# Patient Record
Sex: MALE | Race: WHITE | Employment: OTHER | URBAN - METROPOLITAN AREA
[De-identification: names, ages, dates, MRNs, and addresses within clinical notes are randomized per-mention and may not be internally consistent; named-entity substitution may affect disease eponyms.]

---

## 2017-01-10 ENCOUNTER — HOSPITAL ENCOUNTER (OUTPATIENT)
Dept: ULTRASOUND IMAGING | Age: 77
Discharge: HOME | End: 2017-01-10
Attending: INTERNAL MEDICINE | Admitting: INTERNAL MEDICINE

## 2017-01-10 DIAGNOSIS — N28.89 OTHER SPECIFIED DISORDERS OF KIDNEY AND URETER: ICD-10-CM

## 2017-01-10 DIAGNOSIS — N18.30 CHRONIC KIDNEY DISEASE (CKD), STAGE III (MODERATE) (HCC): ICD-10-CM

## 2018-05-14 ENCOUNTER — TELEPHONE (OUTPATIENT)
Dept: SURGERY | Age: 78
End: 2018-05-14

## 2019-02-28 ENCOUNTER — APPOINTMENT (OUTPATIENT)
Dept: GENERAL RADIOLOGY | Age: 79
DRG: 291 | End: 2019-02-28
Payer: MEDICARE

## 2019-02-28 ENCOUNTER — HOSPITAL ENCOUNTER (INPATIENT)
Age: 79
LOS: 3 days | Discharge: LEFT BEFORE DISCHARGE | DRG: 291 | End: 2019-03-03
Attending: EMERGENCY MEDICINE | Admitting: INTERNAL MEDICINE
Payer: MEDICARE

## 2019-02-28 DIAGNOSIS — J96.01 ACUTE RESPIRATORY FAILURE WITH HYPOXIA (HCC): ICD-10-CM

## 2019-02-28 DIAGNOSIS — R06.09 DYSPNEA ON EXERTION: ICD-10-CM

## 2019-02-28 DIAGNOSIS — N17.9 AKI (ACUTE KIDNEY INJURY) (HCC): ICD-10-CM

## 2019-02-28 DIAGNOSIS — J18.9 COMMUNITY ACQUIRED PNEUMONIA OF RIGHT LOWER LOBE OF LUNG: Primary | ICD-10-CM

## 2019-02-28 DIAGNOSIS — I50.9 ACUTE CONGESTIVE HEART FAILURE, UNSPECIFIED HEART FAILURE TYPE (HCC): ICD-10-CM

## 2019-02-28 PROBLEM — D69.6 THROMBOCYTOPENIA (HCC): Chronic | Status: ACTIVE | Noted: 2019-02-28

## 2019-02-28 PROBLEM — I35.0 NONRHEUMATIC AORTIC VALVE STENOSIS: Chronic | Status: ACTIVE | Noted: 2017-03-20

## 2019-02-28 PROBLEM — I48.91 ATRIAL FIBRILLATION (HCC): Status: ACTIVE | Noted: 2019-02-28

## 2019-02-28 PROBLEM — I73.9 PERIPHERAL VASCULAR DISEASE (HCC): Status: ACTIVE | Noted: 2019-02-28

## 2019-02-28 PROBLEM — F31.9 BIPOLAR AFFECTIVE DISORDER (HCC): Status: ACTIVE | Noted: 2019-02-28

## 2019-02-28 PROBLEM — N18.4 CKD (CHRONIC KIDNEY DISEASE) STAGE 4, GFR 15-29 ML/MIN (HCC): Status: ACTIVE | Noted: 2017-03-19

## 2019-02-28 PROBLEM — C44.92 SQUAMOUS CELL CARCINOMA OF SKIN: Chronic | Status: ACTIVE | Noted: 2019-02-28

## 2019-02-28 PROBLEM — I48.91 ATRIAL FIBRILLATION WITH RAPID VENTRICULAR RESPONSE (HCC): Status: ACTIVE | Noted: 2017-03-11

## 2019-02-28 PROBLEM — N18.4 ANEMIA OF CHRONIC RENAL FAILURE, STAGE 4 (SEVERE) (HCC): Chronic | Status: ACTIVE | Noted: 2017-03-20

## 2019-02-28 PROBLEM — I71.40 AAA (ABDOMINAL AORTIC ANEURYSM) WITHOUT RUPTURE: Status: ACTIVE | Noted: 2017-11-29

## 2019-02-28 PROBLEM — C44.92 SQUAMOUS CELL CARCINOMA OF SKIN: Status: ACTIVE | Noted: 2019-02-28

## 2019-02-28 PROBLEM — I50.32 CHRONIC DIASTOLIC CONGESTIVE HEART FAILURE (HCC): Chronic | Status: ACTIVE | Noted: 2017-03-29

## 2019-02-28 PROBLEM — I50.32 CHRONIC DIASTOLIC CONGESTIVE HEART FAILURE (HCC): Status: ACTIVE | Noted: 2017-03-29

## 2019-02-28 PROBLEM — R60.0 BILATERAL LEG EDEMA: Status: ACTIVE | Noted: 2017-11-29

## 2019-02-28 PROBLEM — N18.4 ANEMIA OF CHRONIC RENAL FAILURE, STAGE 4 (SEVERE) (HCC): Status: ACTIVE | Noted: 2017-03-20

## 2019-02-28 PROBLEM — R33.9 URINARY RETENTION WITH INCOMPLETE BLADDER EMPTYING: Status: ACTIVE | Noted: 2017-03-19

## 2019-02-28 PROBLEM — J98.6 ACQUIRED ELEVATED DIAPHRAGM: Status: ACTIVE | Noted: 2017-03-20

## 2019-02-28 PROBLEM — N25.81 SECONDARY HYPERPARATHYROIDISM, RENAL (HCC): Chronic | Status: ACTIVE | Noted: 2018-07-19

## 2019-02-28 PROBLEM — D63.1 ANEMIA OF CHRONIC RENAL FAILURE, STAGE 4 (SEVERE) (HCC): Chronic | Status: ACTIVE | Noted: 2017-03-20

## 2019-02-28 PROBLEM — E55.9 VITAMIN D DEFICIENCY: Status: ACTIVE | Noted: 2018-07-19

## 2019-02-28 PROBLEM — I44.2 INTERMITTENT COMPLETE HEART BLOCK (HCC): Status: ACTIVE | Noted: 2018-08-25

## 2019-02-28 PROBLEM — I71.40 AAA (ABDOMINAL AORTIC ANEURYSM) WITHOUT RUPTURE: Chronic | Status: ACTIVE | Noted: 2017-11-29

## 2019-02-28 PROBLEM — Z79.01 ANTICOAGULATED: Chronic | Status: ACTIVE | Noted: 2019-02-28

## 2019-02-28 PROBLEM — I34.0 NON-RHEUMATIC MITRAL REGURGITATION: Status: ACTIVE | Noted: 2017-03-20

## 2019-02-28 PROBLEM — I71.019 DISSECTION OF THORACIC AORTA: Chronic | Status: ACTIVE | Noted: 2019-02-28

## 2019-02-28 PROBLEM — I71.019 AORTIC DISSECTION, THORACIC: Status: RESOLVED | Noted: 2017-03-20 | Resolved: 2019-02-28

## 2019-02-28 PROBLEM — D50.9 IRON DEFICIENCY ANEMIA: Status: ACTIVE | Noted: 2017-03-29

## 2019-02-28 PROBLEM — E78.5 HYPERLIPIDEMIA: Chronic | Status: ACTIVE | Noted: 2019-02-28

## 2019-02-28 PROBLEM — Z95.0 CARDIAC PACEMAKER IN SITU: Status: ACTIVE | Noted: 2017-11-10

## 2019-02-28 PROBLEM — D50.9 IRON DEFICIENCY ANEMIA: Chronic | Status: ACTIVE | Noted: 2017-03-29

## 2019-02-28 PROBLEM — E78.5 HYPERLIPIDEMIA: Status: ACTIVE | Noted: 2019-02-28

## 2019-02-28 PROBLEM — I71.019 DISSECTION OF THORACIC AORTA: Status: ACTIVE | Noted: 2019-02-28

## 2019-02-28 PROBLEM — N25.81 SECONDARY HYPERPARATHYROIDISM, RENAL (HCC): Status: ACTIVE | Noted: 2018-07-19

## 2019-02-28 PROBLEM — I35.0 NONRHEUMATIC AORTIC VALVE STENOSIS: Status: ACTIVE | Noted: 2017-03-20

## 2019-02-28 PROBLEM — J98.6 ACQUIRED ELEVATED DIAPHRAGM: Chronic | Status: ACTIVE | Noted: 2017-03-20

## 2019-02-28 PROBLEM — I48.20 CHRONIC ATRIAL FIBRILLATION (HCC): Chronic | Status: ACTIVE | Noted: 2017-01-06

## 2019-02-28 PROBLEM — I48.21 PERMANENT ATRIAL FIBRILLATION (HCC): Status: ACTIVE | Noted: 2017-06-29

## 2019-02-28 PROBLEM — I71.019 AORTIC DISSECTION, THORACIC: Status: ACTIVE | Noted: 2017-03-20

## 2019-02-28 PROBLEM — N18.4 CKD (CHRONIC KIDNEY DISEASE) STAGE 4, GFR 15-29 ML/MIN (HCC): Chronic | Status: ACTIVE | Noted: 2017-03-19

## 2019-02-28 PROBLEM — Z95.0 CARDIAC PACEMAKER IN SITU: Chronic | Status: ACTIVE | Noted: 2017-11-10

## 2019-02-28 PROBLEM — F31.9 BIPOLAR AFFECTIVE DISORDER (HCC): Chronic | Status: ACTIVE | Noted: 2019-02-28

## 2019-02-28 PROBLEM — I48.20 CHRONIC ATRIAL FIBRILLATION (HCC): Status: ACTIVE | Noted: 2017-01-06

## 2019-02-28 PROBLEM — I34.0 NON-RHEUMATIC MITRAL REGURGITATION: Chronic | Status: ACTIVE | Noted: 2017-03-20

## 2019-02-28 PROBLEM — D63.1 ANEMIA OF CHRONIC RENAL FAILURE, STAGE 4 (SEVERE) (HCC): Status: ACTIVE | Noted: 2017-03-20

## 2019-02-28 LAB
ALBUMIN SERPL-MCNC: 4 G/DL (ref 3.4–5)
ALP BLD-CCNC: 198 U/L (ref 40–129)
ALT SERPL-CCNC: 13 U/L (ref 10–40)
ANION GAP SERPL CALCULATED.3IONS-SCNC: 17 MMOL/L (ref 3–16)
AST SERPL-CCNC: 17 U/L (ref 15–37)
BASE EXCESS VENOUS: -4 (ref -3–3)
BASE EXCESS VENOUS: 0 (ref -3–3)
BASOPHILS ABSOLUTE: 0.1 K/UL (ref 0–0.2)
BASOPHILS RELATIVE PERCENT: 1.4 %
BILIRUB SERPL-MCNC: 0.7 MG/DL (ref 0–1)
BILIRUBIN DIRECT: <0.2 MG/DL (ref 0–0.3)
BILIRUBIN URINE: NEGATIVE
BILIRUBIN, INDIRECT: ABNORMAL MG/DL (ref 0–1)
BLOOD, URINE: NEGATIVE
BUN BLDV-MCNC: 38 MG/DL (ref 7–20)
CALCIUM SERPL-MCNC: 8.6 MG/DL (ref 8.3–10.6)
CHLORIDE BLD-SCNC: 101 MMOL/L (ref 99–110)
CLARITY: CLEAR
CO2: 21 MMOL/L (ref 21–32)
COLOR: YELLOW
CREAT SERPL-MCNC: 2.4 MG/DL (ref 0.8–1.3)
EOSINOPHILS ABSOLUTE: 0.2 K/UL (ref 0–0.6)
EOSINOPHILS RELATIVE PERCENT: 2.3 %
GFR AFRICAN AMERICAN: 32
GFR NON-AFRICAN AMERICAN: 26
GLUCOSE BLD-MCNC: 103 MG/DL (ref 70–99)
GLUCOSE URINE: NEGATIVE MG/DL
HCO3 VENOUS: 22.9 MMOL/L (ref 23–29)
HCO3 VENOUS: 26.2 MMOL/L (ref 23–29)
HCT VFR BLD CALC: 37.4 % (ref 40.5–52.5)
HEMOGLOBIN: 11.7 G/DL (ref 13.5–17.5)
INR BLD: 1.55 (ref 0.86–1.14)
KETONES, URINE: NEGATIVE MG/DL
LACTATE: 1.61 MMOL/L (ref 0.4–2)
LEUKOCYTE ESTERASE, URINE: NEGATIVE
LYMPHOCYTES ABSOLUTE: 0.6 K/UL (ref 1–5.1)
LYMPHOCYTES RELATIVE PERCENT: 9 %
MCH RBC QN AUTO: 30.3 PG (ref 26–34)
MCHC RBC AUTO-ENTMCNC: 31.4 G/DL (ref 31–36)
MCV RBC AUTO: 96.4 FL (ref 80–100)
MICROSCOPIC EXAMINATION: NORMAL
MONOCYTES ABSOLUTE: 0.5 K/UL (ref 0–1.3)
MONOCYTES RELATIVE PERCENT: 7.7 %
NEUTROPHILS ABSOLUTE: 5.5 K/UL (ref 1.7–7.7)
NEUTROPHILS RELATIVE PERCENT: 79.6 %
NITRITE, URINE: NEGATIVE
O2 SAT, VEN: 54 %
O2 SAT, VEN: 55 %
PCO2, VEN: 47.9 MM HG (ref 40–50)
PCO2, VEN: 53.2 MM HG (ref 40–50)
PDW BLD-RTO: 18.1 % (ref 12.4–15.4)
PERFORMED ON: ABNORMAL
PERFORMED ON: ABNORMAL
PH UA: 6
PH VENOUS: 7.29 (ref 7.35–7.45)
PH VENOUS: 7.3 (ref 7.35–7.45)
PLATELET # BLD: 109 K/UL (ref 135–450)
PMV BLD AUTO: 10.1 FL (ref 5–10.5)
PO2, VEN: 32 MM HG
PO2, VEN: 32 MM HG
POC SAMPLE TYPE: ABNORMAL
POC SAMPLE TYPE: ABNORMAL
POTASSIUM REFLEX MAGNESIUM: 4.7 MMOL/L (ref 3.5–5.1)
PRO-BNP: ABNORMAL PG/ML (ref 0–449)
PROTEIN UA: NEGATIVE MG/DL
PROTHROMBIN TIME: 17.7 SEC (ref 9.8–13)
RBC # BLD: 3.88 M/UL (ref 4.2–5.9)
SODIUM BLD-SCNC: 139 MMOL/L (ref 136–145)
SPECIFIC GRAVITY UA: 1.01
TCO2 CALC VENOUS: 24 MMOL/L
TCO2 CALC VENOUS: 28 MMOL/L
TOTAL PROTEIN: 7.1 G/DL (ref 6.4–8.2)
TROPONIN: 0.02 NG/ML
TROPONIN: 0.02 NG/ML
URINE TYPE: NORMAL
UROBILINOGEN, URINE: 0.2 E.U./DL
WBC # BLD: 6.9 K/UL (ref 4–11)

## 2019-02-28 PROCEDURE — 83880 ASSAY OF NATRIURETIC PEPTIDE: CPT

## 2019-02-28 PROCEDURE — 6360000002 HC RX W HCPCS: Performed by: PHYSICIAN ASSISTANT

## 2019-02-28 PROCEDURE — 84443 ASSAY THYROID STIM HORMONE: CPT

## 2019-02-28 PROCEDURE — 2580000003 HC RX 258: Performed by: STUDENT IN AN ORGANIZED HEALTH CARE EDUCATION/TRAINING PROGRAM

## 2019-02-28 PROCEDURE — 80048 BASIC METABOLIC PNL TOTAL CA: CPT

## 2019-02-28 PROCEDURE — 96367 TX/PROPH/DG ADDL SEQ IV INF: CPT

## 2019-02-28 PROCEDURE — 71046 X-RAY EXAM CHEST 2 VIEWS: CPT

## 2019-02-28 PROCEDURE — 2580000003 HC RX 258: Performed by: PHYSICIAN ASSISTANT

## 2019-02-28 PROCEDURE — 87040 BLOOD CULTURE FOR BACTERIA: CPT

## 2019-02-28 PROCEDURE — 85025 COMPLETE CBC W/AUTO DIFF WBC: CPT

## 2019-02-28 PROCEDURE — 36415 COLL VENOUS BLD VENIPUNCTURE: CPT

## 2019-02-28 PROCEDURE — 84484 ASSAY OF TROPONIN QUANT: CPT

## 2019-02-28 PROCEDURE — 81003 URINALYSIS AUTO W/O SCOPE: CPT

## 2019-02-28 PROCEDURE — 1200000000 HC SEMI PRIVATE

## 2019-02-28 PROCEDURE — 83605 ASSAY OF LACTIC ACID: CPT

## 2019-02-28 PROCEDURE — 6360000002 HC RX W HCPCS: Performed by: STUDENT IN AN ORGANIZED HEALTH CARE EDUCATION/TRAINING PROGRAM

## 2019-02-28 PROCEDURE — 82803 BLOOD GASES ANY COMBINATION: CPT

## 2019-02-28 PROCEDURE — 80076 HEPATIC FUNCTION PANEL: CPT

## 2019-02-28 PROCEDURE — 85610 PROTHROMBIN TIME: CPT

## 2019-02-28 PROCEDURE — 96365 THER/PROPH/DIAG IV INF INIT: CPT

## 2019-02-28 PROCEDURE — 99285 EMERGENCY DEPT VISIT HI MDM: CPT

## 2019-02-28 PROCEDURE — 6370000000 HC RX 637 (ALT 250 FOR IP): Performed by: STUDENT IN AN ORGANIZED HEALTH CARE EDUCATION/TRAINING PROGRAM

## 2019-02-28 PROCEDURE — 96375 TX/PRO/DX INJ NEW DRUG ADDON: CPT

## 2019-02-28 PROCEDURE — 93005 ELECTROCARDIOGRAM TRACING: CPT | Performed by: PHYSICIAN ASSISTANT

## 2019-02-28 PROCEDURE — 84439 ASSAY OF FREE THYROXINE: CPT

## 2019-02-28 RX ORDER — METOPROLOL SUCCINATE 25 MG/1
25 TABLET, EXTENDED RELEASE ORAL DAILY
Status: DISCONTINUED | OUTPATIENT
Start: 2019-03-01 | End: 2019-02-28

## 2019-02-28 RX ORDER — TRANYLCYPROMINE 10 MG/1
10 TABLET ORAL
Status: DISCONTINUED | OUTPATIENT
Start: 2019-03-01 | End: 2019-03-03 | Stop reason: HOSPADM

## 2019-02-28 RX ORDER — AMLODIPINE BESYLATE 5 MG/1
5 TABLET ORAL DAILY
Status: DISCONTINUED | OUTPATIENT
Start: 2019-03-01 | End: 2019-03-03 | Stop reason: HOSPADM

## 2019-02-28 RX ORDER — TRANYLCYPROMINE 10 MG/1
10 TABLET ORAL 2 TIMES DAILY
Status: DISCONTINUED | OUTPATIENT
Start: 2019-02-28 | End: 2019-02-28

## 2019-02-28 RX ORDER — ACETAMINOPHEN 500 MG
500-1000 TABLET ORAL EVERY 6 HOURS PRN
COMMUNITY

## 2019-02-28 RX ORDER — PROPAFENONE HYDROCHLORIDE 150 MG/1
225 TABLET, FILM COATED ORAL EVERY 12 HOURS
Status: DISCONTINUED | OUTPATIENT
Start: 2019-02-28 | End: 2019-02-28

## 2019-02-28 RX ORDER — SODIUM CHLORIDE 0.9 % (FLUSH) 0.9 %
10 SYRINGE (ML) INJECTION EVERY 12 HOURS SCHEDULED
Status: DISCONTINUED | OUTPATIENT
Start: 2019-02-28 | End: 2019-03-03 | Stop reason: HOSPADM

## 2019-02-28 RX ORDER — SODIUM CHLORIDE 0.9 % (FLUSH) 0.9 %
10 SYRINGE (ML) INJECTION PRN
Status: DISCONTINUED | OUTPATIENT
Start: 2019-02-28 | End: 2019-03-03 | Stop reason: HOSPADM

## 2019-02-28 RX ORDER — ATORVASTATIN CALCIUM 10 MG/1
10 TABLET, FILM COATED ORAL NIGHTLY
Status: DISCONTINUED | OUTPATIENT
Start: 2019-02-28 | End: 2019-03-03 | Stop reason: HOSPADM

## 2019-02-28 RX ORDER — QUETIAPINE FUMARATE 200 MG/1
300 TABLET, FILM COATED ORAL NIGHTLY
Status: DISCONTINUED | OUTPATIENT
Start: 2019-02-28 | End: 2019-03-03 | Stop reason: HOSPADM

## 2019-02-28 RX ORDER — QUETIAPINE FUMARATE 300 MG/1
300 TABLET, FILM COATED ORAL NIGHTLY
COMMUNITY
End: 2020-11-11 | Stop reason: CLARIF

## 2019-02-28 RX ORDER — AMLODIPINE BESYLATE AND BENAZEPRIL HYDROCHLORIDE 10; 20 MG/1; MG/1
1 CAPSULE ORAL DAILY
Status: DISCONTINUED | OUTPATIENT
Start: 2019-02-28 | End: 2019-02-28

## 2019-02-28 RX ORDER — METOPROLOL SUCCINATE 25 MG/1
25 TABLET, EXTENDED RELEASE ORAL 2 TIMES DAILY
Status: DISCONTINUED | OUTPATIENT
Start: 2019-02-28 | End: 2019-03-03 | Stop reason: HOSPADM

## 2019-02-28 RX ORDER — FUROSEMIDE 10 MG/ML
40 INJECTION INTRAMUSCULAR; INTRAVENOUS 3 TIMES DAILY
Status: DISCONTINUED | OUTPATIENT
Start: 2019-02-28 | End: 2019-03-01

## 2019-02-28 RX ORDER — FUROSEMIDE 20 MG/1
20 TABLET ORAL 2 TIMES DAILY
Status: ON HOLD | COMMUNITY
End: 2019-03-03 | Stop reason: SDUPTHER

## 2019-02-28 RX ORDER — FUROSEMIDE 10 MG/ML
40 INJECTION INTRAMUSCULAR; INTRAVENOUS ONCE
Status: COMPLETED | OUTPATIENT
Start: 2019-02-28 | End: 2019-02-28

## 2019-02-28 RX ORDER — FERROUS SULFATE 325(65) MG
325 TABLET ORAL
COMMUNITY
End: 2020-11-11 | Stop reason: CLARIF

## 2019-02-28 RX ORDER — ONDANSETRON 2 MG/ML
4 INJECTION INTRAMUSCULAR; INTRAVENOUS EVERY 6 HOURS PRN
Status: DISCONTINUED | OUTPATIENT
Start: 2019-02-28 | End: 2019-03-03 | Stop reason: HOSPADM

## 2019-02-28 RX ORDER — TRANYLCYPROMINE 10 MG/1
30 TABLET ORAL DAILY
Status: DISCONTINUED | OUTPATIENT
Start: 2019-03-01 | End: 2019-03-03 | Stop reason: HOSPADM

## 2019-02-28 RX ORDER — GUAIFENESIN 600 MG/1
600 TABLET, EXTENDED RELEASE ORAL 2 TIMES DAILY PRN
COMMUNITY
End: 2020-11-11 | Stop reason: CLARIF

## 2019-02-28 RX ORDER — AMLODIPINE BESYLATE 5 MG/1
5 TABLET ORAL DAILY
Status: ON HOLD | COMMUNITY
End: 2019-03-03 | Stop reason: HOSPADM

## 2019-02-28 RX ADMIN — AZITHROMYCIN MONOHYDRATE 500 MG: 500 INJECTION, POWDER, LYOPHILIZED, FOR SOLUTION INTRAVENOUS at 16:04

## 2019-02-28 RX ADMIN — ATORVASTATIN CALCIUM 10 MG: 10 TABLET, FILM COATED ORAL at 21:43

## 2019-02-28 RX ADMIN — QUETIAPINE FUMARATE 300 MG: 200 TABLET ORAL at 21:44

## 2019-02-28 RX ADMIN — METOPROLOL SUCCINATE 25 MG: 25 TABLET, EXTENDED RELEASE ORAL at 21:45

## 2019-02-28 RX ADMIN — Medication 10 ML: at 21:48

## 2019-02-28 RX ADMIN — CEFTRIAXONE 1 G: 1 INJECTION, POWDER, FOR SOLUTION INTRAMUSCULAR; INTRAVENOUS at 15:29

## 2019-02-28 RX ADMIN — FUROSEMIDE 40 MG: 10 INJECTION, SOLUTION INTRAMUSCULAR; INTRAVENOUS at 15:23

## 2019-02-28 RX ADMIN — FUROSEMIDE 40 MG: 10 INJECTION, SOLUTION INTRAMUSCULAR; INTRAVENOUS at 21:46

## 2019-02-28 RX ADMIN — APIXABAN 2.5 MG: 2.5 TABLET, FILM COATED ORAL at 21:43

## 2019-02-28 ASSESSMENT — ENCOUNTER SYMPTOMS
COUGH: 1
SHORTNESS OF BREATH: 1

## 2019-02-28 ASSESSMENT — PAIN SCALES - GENERAL
PAINLEVEL_OUTOF10: 0
PAINLEVEL_OUTOF10: 0

## 2019-03-01 PROBLEM — I48.91 ATRIAL FIBRILLATION WITH RAPID VENTRICULAR RESPONSE (HCC): Status: RESOLVED | Noted: 2017-03-11 | Resolved: 2019-03-01

## 2019-03-01 PROBLEM — I73.9 PERIPHERAL VASCULAR DISEASE (HCC): Chronic | Status: ACTIVE | Noted: 2019-02-28

## 2019-03-01 PROBLEM — I48.91 ATRIAL FIBRILLATION (HCC): Status: RESOLVED | Noted: 2019-02-28 | Resolved: 2019-03-01

## 2019-03-01 LAB
ALBUMIN SERPL-MCNC: 4 G/DL (ref 3.4–5)
ANION GAP SERPL CALCULATED.3IONS-SCNC: 14 MMOL/L (ref 3–16)
ANION GAP SERPL CALCULATED.3IONS-SCNC: 14 MMOL/L (ref 3–16)
BASOPHILS ABSOLUTE: 0 K/UL (ref 0–0.2)
BASOPHILS RELATIVE PERCENT: 0.9 %
BUN BLDV-MCNC: 36 MG/DL (ref 7–20)
BUN BLDV-MCNC: 37 MG/DL (ref 7–20)
CALCIUM SERPL-MCNC: 8.4 MG/DL (ref 8.3–10.6)
CALCIUM SERPL-MCNC: 9 MG/DL (ref 8.3–10.6)
CHLORIDE BLD-SCNC: 101 MMOL/L (ref 99–110)
CHLORIDE BLD-SCNC: 97 MMOL/L (ref 99–110)
CO2: 23 MMOL/L (ref 21–32)
CO2: 25 MMOL/L (ref 21–32)
CREAT SERPL-MCNC: 2.7 MG/DL (ref 0.8–1.3)
CREAT SERPL-MCNC: 2.7 MG/DL (ref 0.8–1.3)
EKG ATRIAL RATE: 81 BPM
EKG DIAGNOSIS: NORMAL
EKG Q-T INTERVAL: 426 MS
EKG QRS DURATION: 96 MS
EKG QTC CALCULATION (BAZETT): 482 MS
EKG R AXIS: 90 DEGREES
EKG T AXIS: 60 DEGREES
EKG VENTRICULAR RATE: 77 BPM
EOSINOPHILS ABSOLUTE: 0.1 K/UL (ref 0–0.6)
EOSINOPHILS RELATIVE PERCENT: 2.8 %
GFR AFRICAN AMERICAN: 28
GFR AFRICAN AMERICAN: 28
GFR NON-AFRICAN AMERICAN: 23
GFR NON-AFRICAN AMERICAN: 23
GLUCOSE BLD-MCNC: 119 MG/DL (ref 70–99)
GLUCOSE BLD-MCNC: 90 MG/DL (ref 70–99)
HCT VFR BLD CALC: 35.8 % (ref 40.5–52.5)
HEMOGLOBIN: 11.4 G/DL (ref 13.5–17.5)
LV EF: 58 %
LVEF MODALITY: NORMAL
LYMPHOCYTES ABSOLUTE: 0.7 K/UL (ref 1–5.1)
LYMPHOCYTES RELATIVE PERCENT: 14.1 %
MAGNESIUM: 2.1 MG/DL (ref 1.8–2.4)
MCH RBC QN AUTO: 30.8 PG (ref 26–34)
MCHC RBC AUTO-ENTMCNC: 31.7 G/DL (ref 31–36)
MCV RBC AUTO: 97.1 FL (ref 80–100)
MONOCYTES ABSOLUTE: 0.6 K/UL (ref 0–1.3)
MONOCYTES RELATIVE PERCENT: 11.6 %
NEUTROPHILS ABSOLUTE: 3.6 K/UL (ref 1.7–7.7)
NEUTROPHILS RELATIVE PERCENT: 70.6 %
PDW BLD-RTO: 17.9 % (ref 12.4–15.4)
PHOSPHORUS: 4.2 MG/DL (ref 2.5–4.9)
PLATELET # BLD: 88 K/UL (ref 135–450)
PMV BLD AUTO: 9.8 FL (ref 5–10.5)
POTASSIUM SERPL-SCNC: 4 MMOL/L (ref 3.5–5.1)
POTASSIUM SERPL-SCNC: 4.9 MMOL/L (ref 3.5–5.1)
RBC # BLD: 3.69 M/UL (ref 4.2–5.9)
SODIUM BLD-SCNC: 136 MMOL/L (ref 136–145)
SODIUM BLD-SCNC: 138 MMOL/L (ref 136–145)
T4 FREE: 0.9 NG/DL (ref 0.9–1.8)
TROPONIN: 0.02 NG/ML
TSH SERPL DL<=0.05 MIU/L-ACNC: 1.86 UIU/ML (ref 0.27–4.2)
WBC # BLD: 5.1 K/UL (ref 4–11)

## 2019-03-01 PROCEDURE — 6360000002 HC RX W HCPCS: Performed by: INTERNAL MEDICINE

## 2019-03-01 PROCEDURE — G0008 ADMIN INFLUENZA VIRUS VAC: HCPCS | Performed by: INTERNAL MEDICINE

## 2019-03-01 PROCEDURE — 2580000003 HC RX 258: Performed by: STUDENT IN AN ORGANIZED HEALTH CARE EDUCATION/TRAINING PROGRAM

## 2019-03-01 PROCEDURE — 93306 TTE W/DOPPLER COMPLETE: CPT

## 2019-03-01 PROCEDURE — 83735 ASSAY OF MAGNESIUM: CPT

## 2019-03-01 PROCEDURE — 36415 COLL VENOUS BLD VENIPUNCTURE: CPT

## 2019-03-01 PROCEDURE — 6370000000 HC RX 637 (ALT 250 FOR IP): Performed by: STUDENT IN AN ORGANIZED HEALTH CARE EDUCATION/TRAINING PROGRAM

## 2019-03-01 PROCEDURE — 80069 RENAL FUNCTION PANEL: CPT

## 2019-03-01 PROCEDURE — 80061 LIPID PANEL: CPT

## 2019-03-01 PROCEDURE — 90686 IIV4 VACC NO PRSV 0.5 ML IM: CPT | Performed by: INTERNAL MEDICINE

## 2019-03-01 PROCEDURE — 85025 COMPLETE CBC W/AUTO DIFF WBC: CPT

## 2019-03-01 PROCEDURE — 6360000002 HC RX W HCPCS: Performed by: STUDENT IN AN ORGANIZED HEALTH CARE EDUCATION/TRAINING PROGRAM

## 2019-03-01 PROCEDURE — 1200000000 HC SEMI PRIVATE

## 2019-03-01 RX ORDER — FUROSEMIDE 10 MG/ML
40 INJECTION INTRAMUSCULAR; INTRAVENOUS 2 TIMES DAILY
Status: DISCONTINUED | OUTPATIENT
Start: 2019-03-01 | End: 2019-03-02

## 2019-03-01 RX ADMIN — Medication 10 ML: at 21:36

## 2019-03-01 RX ADMIN — AMLODIPINE BESYLATE 5 MG: 5 TABLET ORAL at 09:24

## 2019-03-01 RX ADMIN — ATORVASTATIN CALCIUM 10 MG: 10 TABLET, FILM COATED ORAL at 21:35

## 2019-03-01 RX ADMIN — Medication 10 ML: at 09:25

## 2019-03-01 RX ADMIN — FUROSEMIDE 40 MG: 10 INJECTION, SOLUTION INTRAMUSCULAR; INTRAVENOUS at 18:19

## 2019-03-01 RX ADMIN — METOPROLOL SUCCINATE 25 MG: 25 TABLET, EXTENDED RELEASE ORAL at 09:24

## 2019-03-01 RX ADMIN — APIXABAN 2.5 MG: 2.5 TABLET, FILM COATED ORAL at 21:35

## 2019-03-01 RX ADMIN — FUROSEMIDE 40 MG: 10 INJECTION, SOLUTION INTRAMUSCULAR; INTRAVENOUS at 09:24

## 2019-03-01 RX ADMIN — INFLUENZA A VIRUS A/MICHIGAN/45/2015 X-275 (H1N1) ANTIGEN (FORMALDEHYDE INACTIVATED), INFLUENZA A VIRUS A/SINGAPORE/INFIMH-16-0019/2016 IVR-186 (H3N2) ANTIGEN (FORMALDEHYDE INACTIVATED), INFLUENZA B VIRUS B/PHUKET/3073/2013 ANTIGEN (FORMALDEHYDE INACTIVATED), AND INFLUENZA B VIRUS B/MARYLAND/15/2016 BX-69A ANTIGEN (FORMALDEHYDE INACTIVATED) 0.5 ML: 15; 15; 15; 15 INJECTION, SUSPENSION INTRAMUSCULAR at 09:28

## 2019-03-01 RX ADMIN — Medication 10 ML: at 18:20

## 2019-03-01 RX ADMIN — METOPROLOL SUCCINATE 25 MG: 25 TABLET, EXTENDED RELEASE ORAL at 21:34

## 2019-03-01 RX ADMIN — APIXABAN 2.5 MG: 2.5 TABLET, FILM COATED ORAL at 09:24

## 2019-03-01 RX ADMIN — QUETIAPINE FUMARATE 300 MG: 200 TABLET ORAL at 21:34

## 2019-03-01 ASSESSMENT — PAIN SCALES - GENERAL
PAINLEVEL_OUTOF10: 0

## 2019-03-02 ENCOUNTER — APPOINTMENT (OUTPATIENT)
Dept: GENERAL RADIOLOGY | Age: 79
DRG: 291 | End: 2019-03-02
Payer: MEDICARE

## 2019-03-02 LAB
ANION GAP SERPL CALCULATED.3IONS-SCNC: 11 MMOL/L (ref 3–16)
BASOPHILS ABSOLUTE: 0.1 K/UL (ref 0–0.2)
BASOPHILS RELATIVE PERCENT: 1 %
BUN BLDV-MCNC: 39 MG/DL (ref 7–20)
CALCIUM SERPL-MCNC: 8.5 MG/DL (ref 8.3–10.6)
CHLORIDE BLD-SCNC: 101 MMOL/L (ref 99–110)
CHOLESTEROL, TOTAL: 98 MG/DL (ref 0–199)
CO2: 28 MMOL/L (ref 21–32)
CREAT SERPL-MCNC: 2.7 MG/DL (ref 0.8–1.3)
EOSINOPHILS ABSOLUTE: 0.1 K/UL (ref 0–0.6)
EOSINOPHILS RELATIVE PERCENT: 2.6 %
GFR AFRICAN AMERICAN: 28
GFR NON-AFRICAN AMERICAN: 23
GLUCOSE BLD-MCNC: 95 MG/DL (ref 70–99)
HCT VFR BLD CALC: 34.9 % (ref 40.5–52.5)
HDLC SERPL-MCNC: 51 MG/DL (ref 40–60)
HEMOGLOBIN: 11.2 G/DL (ref 13.5–17.5)
LDL CHOLESTEROL CALCULATED: 35 MG/DL
LYMPHOCYTES ABSOLUTE: 0.7 K/UL (ref 1–5.1)
LYMPHOCYTES RELATIVE PERCENT: 13.7 %
MAGNESIUM: 2.1 MG/DL (ref 1.8–2.4)
MCH RBC QN AUTO: 30.5 PG (ref 26–34)
MCHC RBC AUTO-ENTMCNC: 32.1 G/DL (ref 31–36)
MCV RBC AUTO: 94.9 FL (ref 80–100)
MONOCYTES ABSOLUTE: 0.6 K/UL (ref 0–1.3)
MONOCYTES RELATIVE PERCENT: 11.3 %
NEUTROPHILS ABSOLUTE: 3.8 K/UL (ref 1.7–7.7)
NEUTROPHILS RELATIVE PERCENT: 71.4 %
PDW BLD-RTO: 17.4 % (ref 12.4–15.4)
PLATELET # BLD: 92 K/UL (ref 135–450)
PMV BLD AUTO: 10.2 FL (ref 5–10.5)
POTASSIUM SERPL-SCNC: 4.6 MMOL/L (ref 3.5–5.1)
PRO-BNP: ABNORMAL PG/ML (ref 0–449)
PROCALCITONIN: 0.17 NG/ML (ref 0–0.15)
RBC # BLD: 3.68 M/UL (ref 4.2–5.9)
SODIUM BLD-SCNC: 140 MMOL/L (ref 136–145)
TRIGL SERPL-MCNC: 59 MG/DL (ref 0–150)
VLDLC SERPL CALC-MCNC: 12 MG/DL
WBC # BLD: 5.3 K/UL (ref 4–11)

## 2019-03-02 PROCEDURE — 6360000002 HC RX W HCPCS: Performed by: INTERNAL MEDICINE

## 2019-03-02 PROCEDURE — 71045 X-RAY EXAM CHEST 1 VIEW: CPT

## 2019-03-02 PROCEDURE — 94761 N-INVAS EAR/PLS OXIMETRY MLT: CPT

## 2019-03-02 PROCEDURE — 36415 COLL VENOUS BLD VENIPUNCTURE: CPT

## 2019-03-02 PROCEDURE — 2580000003 HC RX 258: Performed by: STUDENT IN AN ORGANIZED HEALTH CARE EDUCATION/TRAINING PROGRAM

## 2019-03-02 PROCEDURE — 6370000000 HC RX 637 (ALT 250 FOR IP): Performed by: STUDENT IN AN ORGANIZED HEALTH CARE EDUCATION/TRAINING PROGRAM

## 2019-03-02 PROCEDURE — 94664 DEMO&/EVAL PT USE INHALER: CPT

## 2019-03-02 PROCEDURE — 83880 ASSAY OF NATRIURETIC PEPTIDE: CPT

## 2019-03-02 PROCEDURE — 80048 BASIC METABOLIC PNL TOTAL CA: CPT

## 2019-03-02 PROCEDURE — 85025 COMPLETE CBC W/AUTO DIFF WBC: CPT

## 2019-03-02 PROCEDURE — 83735 ASSAY OF MAGNESIUM: CPT

## 2019-03-02 PROCEDURE — 1200000000 HC SEMI PRIVATE

## 2019-03-02 PROCEDURE — 84145 PROCALCITONIN (PCT): CPT

## 2019-03-02 RX ORDER — FUROSEMIDE 10 MG/ML
40 INJECTION INTRAMUSCULAR; INTRAVENOUS ONCE
Status: COMPLETED | OUTPATIENT
Start: 2019-03-02 | End: 2019-03-02

## 2019-03-02 RX ORDER — FUROSEMIDE 40 MG/1
40 TABLET ORAL 2 TIMES DAILY
Status: DISCONTINUED | OUTPATIENT
Start: 2019-03-03 | End: 2019-03-03

## 2019-03-02 RX ORDER — FUROSEMIDE 20 MG/1
20 TABLET ORAL 2 TIMES DAILY
Status: DISCONTINUED | OUTPATIENT
Start: 2019-03-02 | End: 2019-03-02

## 2019-03-02 RX ADMIN — FUROSEMIDE 40 MG: 10 INJECTION, SOLUTION INTRAMUSCULAR; INTRAVENOUS at 09:11

## 2019-03-02 RX ADMIN — AMLODIPINE BESYLATE 5 MG: 5 TABLET ORAL at 09:10

## 2019-03-02 RX ADMIN — Medication 10 ML: at 10:02

## 2019-03-02 RX ADMIN — APIXABAN 2.5 MG: 2.5 TABLET, FILM COATED ORAL at 09:10

## 2019-03-02 RX ADMIN — FUROSEMIDE 40 MG: 10 INJECTION, SOLUTION INTRAMUSCULAR; INTRAVENOUS at 17:03

## 2019-03-02 RX ADMIN — TRANYLCYPROMINE 10 MG: 10 TABLET ORAL at 13:05

## 2019-03-02 RX ADMIN — Medication 10 ML: at 20:34

## 2019-03-02 RX ADMIN — METOPROLOL SUCCINATE 25 MG: 25 TABLET, EXTENDED RELEASE ORAL at 09:10

## 2019-03-02 RX ADMIN — ATORVASTATIN CALCIUM 10 MG: 10 TABLET, FILM COATED ORAL at 20:33

## 2019-03-02 RX ADMIN — QUETIAPINE FUMARATE 300 MG: 200 TABLET ORAL at 20:33

## 2019-03-02 RX ADMIN — TRANYLCYPROMINE 30 MG: 10 TABLET ORAL at 09:12

## 2019-03-02 RX ADMIN — METOPROLOL SUCCINATE 25 MG: 25 TABLET, EXTENDED RELEASE ORAL at 20:33

## 2019-03-02 RX ADMIN — APIXABAN 2.5 MG: 2.5 TABLET, FILM COATED ORAL at 20:33

## 2019-03-02 ASSESSMENT — PAIN SCALES - GENERAL
PAINLEVEL_OUTOF10: 0

## 2019-03-03 VITALS
DIASTOLIC BLOOD PRESSURE: 79 MMHG | OXYGEN SATURATION: 97 % | WEIGHT: 178.4 LBS | SYSTOLIC BLOOD PRESSURE: 116 MMHG | HEIGHT: 74 IN | TEMPERATURE: 97.6 F | BODY MASS INDEX: 22.89 KG/M2 | RESPIRATION RATE: 18 BRPM | HEART RATE: 73 BPM

## 2019-03-03 LAB
ANION GAP SERPL CALCULATED.3IONS-SCNC: 13 MMOL/L (ref 3–16)
BASE EXCESS ARTERIAL: 3.9 MMOL/L (ref -3–3)
BASOPHILS ABSOLUTE: 0.1 K/UL (ref 0–0.2)
BASOPHILS RELATIVE PERCENT: 1 %
BUN BLDV-MCNC: 43 MG/DL (ref 7–20)
CALCIUM SERPL-MCNC: 8.2 MG/DL (ref 8.3–10.6)
CARBOXYHEMOGLOBIN ARTERIAL: 2.4 % (ref 0–1.5)
CHLORIDE BLD-SCNC: 99 MMOL/L (ref 99–110)
CO2: 29 MMOL/L (ref 21–32)
CREAT SERPL-MCNC: 2.8 MG/DL (ref 0.8–1.3)
EOSINOPHILS ABSOLUTE: 0.2 K/UL (ref 0–0.6)
EOSINOPHILS RELATIVE PERCENT: 3 %
GFR AFRICAN AMERICAN: 27
GFR NON-AFRICAN AMERICAN: 22
GLUCOSE BLD-MCNC: 82 MG/DL (ref 70–99)
HCO3 ARTERIAL: 30 MMOL/L (ref 21–29)
HCT VFR BLD CALC: 34.2 % (ref 40.5–52.5)
HEMOGLOBIN, ART, EXTENDED: 11.6 G/DL
HEMOGLOBIN: 11 G/DL (ref 13.5–17.5)
LYMPHOCYTES ABSOLUTE: 0.8 K/UL (ref 1–5.1)
LYMPHOCYTES RELATIVE PERCENT: 15.9 %
MAGNESIUM: 2 MG/DL (ref 1.8–2.4)
MCH RBC QN AUTO: 30.9 PG (ref 26–34)
MCHC RBC AUTO-ENTMCNC: 32.2 G/DL (ref 31–36)
MCV RBC AUTO: 96 FL (ref 80–100)
METHEMOGLOBIN ARTERIAL: 0.4 % (ref 0–1.4)
MONOCYTES ABSOLUTE: 0.7 K/UL (ref 0–1.3)
MONOCYTES RELATIVE PERCENT: 12.9 %
NEUTROPHILS ABSOLUTE: 3.5 K/UL (ref 1.7–7.7)
NEUTROPHILS RELATIVE PERCENT: 67.2 %
O2 SAT, ARTERIAL: 82 % (ref 93–100)
PCO2 ARTERIAL: 53.7 MMHG (ref 35–45)
PDW BLD-RTO: 17.8 % (ref 12.4–15.4)
PH ARTERIAL: 7.36 (ref 7.35–7.45)
PLATELET # BLD: 78 K/UL (ref 135–450)
PMV BLD AUTO: 9.8 FL (ref 5–10.5)
PO2 ARTERIAL: 49.7 MMHG (ref 75–108)
POTASSIUM SERPL-SCNC: 4 MMOL/L (ref 3.5–5.1)
RBC # BLD: 3.56 M/UL (ref 4.2–5.9)
SODIUM BLD-SCNC: 141 MMOL/L (ref 136–145)
TCO2 ARTERIAL: 31 MMOL/L
WBC # BLD: 5.2 K/UL (ref 4–11)

## 2019-03-03 PROCEDURE — 36600 WITHDRAWAL OF ARTERIAL BLOOD: CPT

## 2019-03-03 PROCEDURE — 82803 BLOOD GASES ANY COMBINATION: CPT

## 2019-03-03 PROCEDURE — 2580000003 HC RX 258: Performed by: STUDENT IN AN ORGANIZED HEALTH CARE EDUCATION/TRAINING PROGRAM

## 2019-03-03 PROCEDURE — 6360000002 HC RX W HCPCS: Performed by: INTERNAL MEDICINE

## 2019-03-03 PROCEDURE — 83735 ASSAY OF MAGNESIUM: CPT

## 2019-03-03 PROCEDURE — 80048 BASIC METABOLIC PNL TOTAL CA: CPT

## 2019-03-03 PROCEDURE — 6370000000 HC RX 637 (ALT 250 FOR IP): Performed by: STUDENT IN AN ORGANIZED HEALTH CARE EDUCATION/TRAINING PROGRAM

## 2019-03-03 PROCEDURE — 36415 COLL VENOUS BLD VENIPUNCTURE: CPT

## 2019-03-03 PROCEDURE — 85025 COMPLETE CBC W/AUTO DIFF WBC: CPT

## 2019-03-03 RX ORDER — FUROSEMIDE 20 MG/1
40 TABLET ORAL 2 TIMES DAILY
Qty: 60 TABLET | Refills: 1 | Status: SHIPPED | OUTPATIENT
Start: 2019-03-03 | End: 2020-11-11 | Stop reason: CLARIF

## 2019-03-03 RX ORDER — FUROSEMIDE 10 MG/ML
40 INJECTION INTRAMUSCULAR; INTRAVENOUS ONCE
Status: COMPLETED | OUTPATIENT
Start: 2019-03-03 | End: 2019-03-03

## 2019-03-03 RX ADMIN — Medication 10 ML: at 08:31

## 2019-03-03 RX ADMIN — TRANYLCYPROMINE 10 MG: 10 TABLET ORAL at 14:17

## 2019-03-03 RX ADMIN — APIXABAN 2.5 MG: 2.5 TABLET, FILM COATED ORAL at 08:31

## 2019-03-03 RX ADMIN — FUROSEMIDE 40 MG: 10 INJECTION, SOLUTION INTRAMUSCULAR; INTRAVENOUS at 08:31

## 2019-03-03 RX ADMIN — AMLODIPINE BESYLATE 5 MG: 5 TABLET ORAL at 08:31

## 2019-03-03 RX ADMIN — METOPROLOL SUCCINATE 25 MG: 25 TABLET, EXTENDED RELEASE ORAL at 08:31

## 2019-03-03 RX ADMIN — TRANYLCYPROMINE 30 MG: 10 TABLET ORAL at 08:34

## 2019-03-03 ASSESSMENT — PAIN SCALES - GENERAL
PAINLEVEL_OUTOF10: 0

## 2019-03-04 ENCOUNTER — CARE COORDINATION (OUTPATIENT)
Dept: CASE MANAGEMENT | Age: 79
End: 2019-03-04

## 2019-03-05 ENCOUNTER — CARE COORDINATION (OUTPATIENT)
Dept: CASE MANAGEMENT | Age: 79
End: 2019-03-05

## 2019-03-05 LAB
BLOOD CULTURE, ROUTINE: NORMAL
CULTURE, BLOOD 2: NORMAL

## 2019-03-05 NOTE — LETTER
Beneficiary Notification Letter     This [facility] Provider is Participating in an Innovative Payment and [address] [place] from Stephanie Liu: [facility] is participating in a Medicare initiative called the [facility] for [address] [facility]. You are receiving this letter because your health care provider has identified you as a patient who is receiving care through this initiative. Health care providers participating in the [facility] [address] [facility], including The [facility], will work with Medicare to improve care for patients. Your Medicare rights have not been changed. You still have all the same Medicare rights and protections, including the right to choose which hospital, doctor, or other health care provider you see. However, because The [facility] [address] chose to participate in the [facility] [address] [facility], all Medicare beneficiaries who meet the eligibility criteria of this initiative will receive care under the initiative. If you do not wish to receive care under the Bundled Payments [facility] [address] [facility], you must choose a health care provider that does not participate in this initiative for your care. Regardless of which health care provider you see, Medicare will continue to cover all of your medically necessary services. Bundled Payments for Care Improvement Advanced aims to help improve your care     The Bundled Payments [facility] [address] [facility] is an innovative Medicare initiative that encourages your doctors, hospitals, and other health care providers to work more closely together so you get better care during and following certain hospital stays.  In this initiative, doctors and hospitals may work closely with certain health care providers and suppliers that help patients recover after discharge from the hospital, including skilled nursing facilities, home health agencies, inpatient rehabilitation facilities, and long term care hospitals. The [facility] is working closely with the doctors and other health care providers that care for you during and following your hospital stay and for a period of time after you leave the hospital. By working together, the health care providers are trying to more efficiently provide well-managed, high quality, patient-centered care as you undergo treatment. Hospitals, doctors, and other health care providers that care for you following a hospital stay may receive an additional payment for providing better, more coordinated health care. Medicare will monitor your care to make sure you and others get high quality care. Your feedback is important     Medicare may also ask you to answer a survey about the services and care you received from The [facility]. The survey will be mailed to you. Your feedback will improve care for all people with Medicare who receive care from The [facility]. Completion of this survey is optional.     Get more information     For more information about the Bundled Payments for [address] [facility], you can:    · Visit the CMS BPCI Advanced Website at http://macias-valverde.net/ initiatives/bpci-advanced   · Call the [facility] team at (511) 774-4176. · Call 1-800-MEDICARE (7-106.908.4235). TTY users can call 7-151.132.8666     If you have concerns or complaints about your care, talk to your health care provider, or contact your Beneficiary and Family Centered Quality Improvement Organization [facility]). To get your CC-QIO's phone number, visit Medicare.gov/contacts or call 1-800-MEDICARE. · To find a different hospital, visit www. hospitalcompare.[facility].gov or call 1-800- MEDICARE (7-862.672.7988). TTY users should call 1-491.880.8054. · To find a different doctor, visit Medicare's Physician Compare website, HDTapes.co.nz, or call 1-800-MEDICARE (825 5013). TTY users should call 6-276.129.9482. · To find a different skilled nursing facility, visit [employer] website, https://www.Ninja Blocks/, or call 1-800-MEDICARE (1- 739.766.9775). TTY users should call 5-915.923.1657. · To find a different long term care hospital, visit [facility] [address] Compare website, InnovandlogLinguaLeo.[employer] / HeyGorgeous.com, or call 1-800- MEDICARE (587 4002). TTY users should call 4-141.943.7730. · To find a different inpatient rehabilitation facility, visit [facility] Compare website, www.medicare.gov/ inpatientrehabilitation facilitycompare, or call 1-800-MEDICARE (4-863.394.6613). TTY users should call 7- 469.227.3175. · To find a different home health agency, visit [address] website, www.medicare.gov/homehealthcompare, or call 1-800-MEDICARE (2-122- 142-4783). TTY users should call 7-578.885.2635.

## 2019-03-05 NOTE — CARE COORDINATION
[facility] mailed Pt a copy of the BPCI-A beneficiary letter. SRIDHAR English, RN  Care Transition Coordinator  Contact Number:  [phone]

## 2020-11-11 ENCOUNTER — APPOINTMENT (OUTPATIENT)
Dept: CT IMAGING | Age: 80
DRG: 291 | End: 2020-11-11
Payer: MEDICARE

## 2020-11-11 ENCOUNTER — APPOINTMENT (OUTPATIENT)
Dept: GENERAL RADIOLOGY | Age: 80
DRG: 291 | End: 2020-11-11
Payer: MEDICARE

## 2020-11-11 ENCOUNTER — HOSPITAL ENCOUNTER (INPATIENT)
Age: 80
LOS: 4 days | Discharge: HOME HEALTH | DRG: 291 | End: 2020-11-15
Attending: EMERGENCY MEDICINE | Admitting: INTERNAL MEDICINE
Payer: MEDICARE

## 2020-11-11 ENCOUNTER — APPOINTMENT (OUTPATIENT)
Dept: ULTRASOUND IMAGING | Age: 80
DRG: 291 | End: 2020-11-11
Payer: MEDICARE

## 2020-11-11 PROBLEM — I50.43 CHF (CONGESTIVE HEART FAILURE), NYHA CLASS I, ACUTE ON CHRONIC, COMBINED (HCC): Status: ACTIVE | Noted: 2020-11-11

## 2020-11-11 LAB
ANION GAP SERPL CALCULATED.3IONS-SCNC: 13 MMOL/L (ref 3–16)
ANISOCYTOSIS: ABNORMAL
BASOPHILS ABSOLUTE: 0 K/UL (ref 0–0.2)
BASOPHILS RELATIVE PERCENT: 0.9 %
BILIRUBIN URINE: NEGATIVE
BLOOD, URINE: ABNORMAL
BUN BLDV-MCNC: 43 MG/DL (ref 7–20)
CALCIUM SERPL-MCNC: 9 MG/DL (ref 8.3–10.6)
CHLORIDE BLD-SCNC: 98 MMOL/L (ref 99–110)
CLARITY: CLEAR
CO2: 24 MMOL/L (ref 21–32)
COLOR: YELLOW
CREAT SERPL-MCNC: 3.6 MG/DL (ref 0.8–1.3)
CREATININE URINE: 19.7 MG/DL (ref 39–259)
EKG ATRIAL RATE: 45 BPM
EKG DIAGNOSIS: NORMAL
EKG Q-T INTERVAL: 408 MS
EKG QRS DURATION: 96 MS
EKG QTC CALCULATION (BAZETT): 452 MS
EKG R AXIS: 87 DEGREES
EKG T AXIS: 2 DEGREES
EKG VENTRICULAR RATE: 74 BPM
EOSINOPHILS ABSOLUTE: 0.1 K/UL (ref 0–0.6)
EOSINOPHILS RELATIVE PERCENT: 1 %
GFR AFRICAN AMERICAN: 20
GFR NON-AFRICAN AMERICAN: 16
GLUCOSE BLD-MCNC: 113 MG/DL (ref 70–99)
GLUCOSE URINE: NEGATIVE MG/DL
HCT VFR BLD CALC: 32.8 % (ref 40.5–52.5)
HEMOGLOBIN: 10.7 G/DL (ref 13.5–17.5)
KETONES, URINE: NEGATIVE MG/DL
LEUKOCYTE ESTERASE, URINE: NEGATIVE
LYMPHOCYTES ABSOLUTE: 0.7 K/UL (ref 1–5.1)
LYMPHOCYTES RELATIVE PERCENT: 12.5 %
MACROCYTES: ABNORMAL
MAGNESIUM: 2.3 MG/DL (ref 1.8–2.4)
MCH RBC QN AUTO: 32.3 PG (ref 26–34)
MCHC RBC AUTO-ENTMCNC: 32.7 G/DL (ref 31–36)
MCV RBC AUTO: 98.9 FL (ref 80–100)
MICROALBUMIN UR-MCNC: 2.8 MG/DL
MICROALBUMIN/CREAT UR-RTO: 142.1 MG/G (ref 0–30)
MICROSCOPIC EXAMINATION: YES
MONOCYTES ABSOLUTE: 0.7 K/UL (ref 0–1.3)
MONOCYTES RELATIVE PERCENT: 12.6 %
NEUTROPHILS ABSOLUTE: 4 K/UL (ref 1.7–7.7)
NEUTROPHILS RELATIVE PERCENT: 73 %
NITRITE, URINE: NEGATIVE
OVALOCYTES: ABNORMAL
PARATHYROID HORMONE INTACT: 93.5 PG/ML (ref 14–72)
PDW BLD-RTO: 18.5 % (ref 12.4–15.4)
PH UA: 6 (ref 5–8)
PHOSPHORUS: 3.4 MG/DL (ref 2.5–4.9)
PLATELET # BLD: 78 K/UL (ref 135–450)
PLATELET SLIDE REVIEW: ABNORMAL
PMV BLD AUTO: 10.8 FL (ref 5–10.5)
POIKILOCYTES: ABNORMAL
POLYCHROMASIA: ABNORMAL
POTASSIUM SERPL-SCNC: 3.6 MMOL/L (ref 3.5–5.1)
PRO-BNP: ABNORMAL PG/ML (ref 0–449)
PROTEIN UA: ABNORMAL MG/DL
RBC # BLD: 3.32 M/UL (ref 4.2–5.9)
RBC UA: NORMAL /HPF (ref 0–4)
SODIUM BLD-SCNC: 135 MMOL/L (ref 136–145)
SPECIFIC GRAVITY UA: 1.02 (ref 1–1.03)
TROPONIN: 0.04 NG/ML
TROPONIN: 0.05 NG/ML
URINE TYPE: ABNORMAL
UROBILINOGEN, URINE: 0.2 E.U./DL
WBC # BLD: 5.5 K/UL (ref 4–11)
WBC UA: NORMAL /HPF (ref 0–5)

## 2020-11-11 PROCEDURE — 2580000003 HC RX 258: Performed by: INTERNAL MEDICINE

## 2020-11-11 PROCEDURE — 71045 X-RAY EXAM CHEST 1 VIEW: CPT

## 2020-11-11 PROCEDURE — 81001 URINALYSIS AUTO W/SCOPE: CPT

## 2020-11-11 PROCEDURE — 84484 ASSAY OF TROPONIN QUANT: CPT

## 2020-11-11 PROCEDURE — 36415 COLL VENOUS BLD VENIPUNCTURE: CPT

## 2020-11-11 PROCEDURE — 83735 ASSAY OF MAGNESIUM: CPT

## 2020-11-11 PROCEDURE — 82306 VITAMIN D 25 HYDROXY: CPT

## 2020-11-11 PROCEDURE — 70450 CT HEAD/BRAIN W/O DYE: CPT

## 2020-11-11 PROCEDURE — 82043 UR ALBUMIN QUANTITATIVE: CPT

## 2020-11-11 PROCEDURE — 1200000000 HC SEMI PRIVATE

## 2020-11-11 PROCEDURE — 99284 EMERGENCY DEPT VISIT MOD MDM: CPT

## 2020-11-11 PROCEDURE — 96375 TX/PRO/DX INJ NEW DRUG ADDON: CPT

## 2020-11-11 PROCEDURE — 6360000002 HC RX W HCPCS: Performed by: INTERNAL MEDICINE

## 2020-11-11 PROCEDURE — 96374 THER/PROPH/DIAG INJ IV PUSH: CPT

## 2020-11-11 PROCEDURE — P9612 CATHETERIZE FOR URINE SPEC: HCPCS

## 2020-11-11 PROCEDURE — 76770 US EXAM ABDO BACK WALL COMP: CPT

## 2020-11-11 PROCEDURE — 83880 ASSAY OF NATRIURETIC PEPTIDE: CPT

## 2020-11-11 PROCEDURE — 2060000000 HC ICU INTERMEDIATE R&B

## 2020-11-11 PROCEDURE — 82570 ASSAY OF URINE CREATININE: CPT

## 2020-11-11 PROCEDURE — 6370000000 HC RX 637 (ALT 250 FOR IP): Performed by: INTERNAL MEDICINE

## 2020-11-11 PROCEDURE — 6360000002 HC RX W HCPCS: Performed by: EMERGENCY MEDICINE

## 2020-11-11 PROCEDURE — 99451 NTRPROF PH1/NTRNET/EHR 5/>: CPT | Performed by: INTERNAL MEDICINE

## 2020-11-11 PROCEDURE — 6370000000 HC RX 637 (ALT 250 FOR IP): Performed by: EMERGENCY MEDICINE

## 2020-11-11 PROCEDURE — 85025 COMPLETE CBC W/AUTO DIFF WBC: CPT

## 2020-11-11 PROCEDURE — 72125 CT NECK SPINE W/O DYE: CPT

## 2020-11-11 PROCEDURE — 80048 BASIC METABOLIC PNL TOTAL CA: CPT

## 2020-11-11 PROCEDURE — 84100 ASSAY OF PHOSPHORUS: CPT

## 2020-11-11 PROCEDURE — 83970 ASSAY OF PARATHORMONE: CPT

## 2020-11-11 PROCEDURE — 93005 ELECTROCARDIOGRAM TRACING: CPT | Performed by: EMERGENCY MEDICINE

## 2020-11-11 RX ORDER — TRANYLCYPROMINE 10 MG/1
20 TABLET ORAL 2 TIMES DAILY
COMMUNITY

## 2020-11-11 RX ORDER — MORPHINE SULFATE 2 MG/ML
1 INJECTION, SOLUTION INTRAMUSCULAR; INTRAVENOUS ONCE
Status: COMPLETED | OUTPATIENT
Start: 2020-11-12 | End: 2020-11-12

## 2020-11-11 RX ORDER — METOPROLOL SUCCINATE 25 MG/1
25 TABLET, EXTENDED RELEASE ORAL 2 TIMES DAILY
Status: DISCONTINUED | OUTPATIENT
Start: 2020-11-11 | End: 2020-11-11

## 2020-11-11 RX ORDER — LORAZEPAM 2 MG/ML
1 INJECTION INTRAMUSCULAR ONCE
Status: COMPLETED | OUTPATIENT
Start: 2020-11-11 | End: 2020-11-11

## 2020-11-11 RX ORDER — SODIUM CHLORIDE 0.9 % (FLUSH) 0.9 %
10 SYRINGE (ML) INJECTION EVERY 12 HOURS SCHEDULED
Status: DISCONTINUED | OUTPATIENT
Start: 2020-11-11 | End: 2020-11-15 | Stop reason: HOSPADM

## 2020-11-11 RX ORDER — HALOPERIDOL 5 MG/ML
2 INJECTION INTRAMUSCULAR ONCE
Status: COMPLETED | OUTPATIENT
Start: 2020-11-11 | End: 2020-11-11

## 2020-11-11 RX ORDER — QUETIAPINE FUMARATE 100 MG/1
200 TABLET, FILM COATED ORAL NIGHTLY
Status: DISCONTINUED | OUTPATIENT
Start: 2020-11-11 | End: 2020-11-15 | Stop reason: HOSPADM

## 2020-11-11 RX ORDER — ATORVASTATIN CALCIUM 20 MG/1
10 TABLET, FILM COATED ORAL NIGHTLY
Status: DISCONTINUED | OUTPATIENT
Start: 2020-11-11 | End: 2020-11-15 | Stop reason: HOSPADM

## 2020-11-11 RX ORDER — FUROSEMIDE 10 MG/ML
80 INJECTION INTRAMUSCULAR; INTRAVENOUS ONCE
Status: COMPLETED | OUTPATIENT
Start: 2020-11-11 | End: 2020-11-11

## 2020-11-11 RX ORDER — ACETAMINOPHEN 325 MG/1
650 TABLET ORAL EVERY 4 HOURS PRN
Status: DISCONTINUED | OUTPATIENT
Start: 2020-11-11 | End: 2020-11-15 | Stop reason: HOSPADM

## 2020-11-11 RX ORDER — QUETIAPINE FUMARATE 25 MG/1
25 TABLET, FILM COATED ORAL 2 TIMES DAILY
COMMUNITY

## 2020-11-11 RX ORDER — SODIUM CHLORIDE 0.9 % (FLUSH) 0.9 %
10 SYRINGE (ML) INJECTION PRN
Status: DISCONTINUED | OUTPATIENT
Start: 2020-11-11 | End: 2020-11-15 | Stop reason: HOSPADM

## 2020-11-11 RX ORDER — BACITRACIN, NEOMYCIN, POLYMYXIN B 400; 3.5; 5 [USP'U]/G; MG/G; [USP'U]/G
OINTMENT TOPICAL ONCE
Status: COMPLETED | OUTPATIENT
Start: 2020-11-11 | End: 2020-11-11

## 2020-11-11 RX ORDER — METOPROLOL SUCCINATE 50 MG/1
50 TABLET, EXTENDED RELEASE ORAL 2 TIMES DAILY
Status: DISCONTINUED | OUTPATIENT
Start: 2020-11-11 | End: 2020-11-15 | Stop reason: HOSPADM

## 2020-11-11 RX ORDER — 0.9 % SODIUM CHLORIDE 0.9 %
500 INTRAVENOUS SOLUTION INTRAVENOUS ONCE
Status: DISCONTINUED | OUTPATIENT
Start: 2020-11-11 | End: 2020-11-11

## 2020-11-11 RX ORDER — FUROSEMIDE 20 MG/1
20 TABLET ORAL 2 TIMES DAILY
COMMUNITY

## 2020-11-11 RX ORDER — FUROSEMIDE 10 MG/ML
40 INJECTION INTRAMUSCULAR; INTRAVENOUS 2 TIMES DAILY
Status: DISCONTINUED | OUTPATIENT
Start: 2020-11-11 | End: 2020-11-13

## 2020-11-11 RX ADMIN — Medication 10 ML: at 08:32

## 2020-11-11 RX ADMIN — BACITRACIN ZINC, NEOMYCIN SULFATE, POLYMYXIN B SULFATE: 3.5; 5000; 4 OINTMENT TOPICAL at 03:04

## 2020-11-11 RX ADMIN — Medication 10 ML: at 17:20

## 2020-11-11 RX ADMIN — QUETIAPINE FUMARATE 200 MG: 100 TABLET ORAL at 19:46

## 2020-11-11 RX ADMIN — METOPROLOL SUCCINATE 50 MG: 50 TABLET, EXTENDED RELEASE ORAL at 19:46

## 2020-11-11 RX ADMIN — APIXABAN 2.5 MG: 5 TABLET, FILM COATED ORAL at 13:10

## 2020-11-11 RX ADMIN — FUROSEMIDE 80 MG: 10 INJECTION, SOLUTION INTRAMUSCULAR; INTRAVENOUS at 05:22

## 2020-11-11 RX ADMIN — APIXABAN 2.5 MG: 5 TABLET, FILM COATED ORAL at 19:46

## 2020-11-11 RX ADMIN — HALOPERIDOL LACTATE 2 MG: 5 INJECTION, SOLUTION INTRAMUSCULAR at 17:20

## 2020-11-11 RX ADMIN — FUROSEMIDE 40 MG: 10 INJECTION, SOLUTION INTRAMUSCULAR; INTRAVENOUS at 19:47

## 2020-11-11 RX ADMIN — METOPROLOL SUCCINATE 50 MG: 50 TABLET, EXTENDED RELEASE ORAL at 13:12

## 2020-11-11 RX ADMIN — LORAZEPAM 1 MG: 2 INJECTION INTRAMUSCULAR; INTRAVENOUS at 23:54

## 2020-11-11 RX ADMIN — ATORVASTATIN CALCIUM 10 MG: 20 TABLET, FILM COATED ORAL at 19:47

## 2020-11-11 RX ADMIN — Medication 10 ML: at 20:00

## 2020-11-11 RX ADMIN — HALOPERIDOL LACTATE 2 MG: 5 INJECTION, SOLUTION INTRAMUSCULAR at 06:10

## 2020-11-11 NOTE — ED NOTES
Bed: B16-16  Expected date:   Expected time:   Means of arrival:   Comments:  [address] [place] [address] [phone] Naomi Khan RN  11/11/20 0232

## 2020-11-11 NOTE — ED NOTES
Multiple attempts to get OOB. Pt resistent to return to bed.  Call placed to hospitalist. Also called house supervisor to inform her of need for a sitter     Nataliia Buckner RN  11/11/20 3632

## 2020-11-11 NOTE — H&P
HISTORY AND PHYSICAL             Date: 11/11/2020        Patient Name: Emery Vallejo     YOB: 1940      Age:  79 y.o. Chief Complaint     Chief Complaint   Patient presents with   Flordia Herd Altered Mental Status     for the past few days per wife to EMS, but even more so when he takes his sleeping pills           History Obtained From   patient, electronic medical record    History of Present Illness   65-year-old male brought to the hospital after a fall and altered mental status. Patient reportedly fell at home. Patient is not a clear historian, unable to describe the sequence of events. Wife told EMS that the patient has increasing confusion over the last several days, also has more confusion when he takes his night sedating medications. Patient was found to be hypoxic on arrival with oxygen saturation in the 80s. Initial screening imaging was negative for any acute pathology or fracture. Patient denies any chest pain, shortness of breath. Denies any nausea or vomiting.   He appears confused and he wants to go home    Past Medical History     Past Medical History:   Diagnosis Date    AAA (abdominal aortic aneurysm) ([address]     AAA (abdominal aortic aneurysm) without rupture ([address] 11/29/2017    Aortic dissection, thoracic ([address] 3/20/2017    Overview:  Emergency type I dissection repair 8/1/16 at [facility] in [address] [place] [address] Aortic dissection, thoracic [facility]) 3/20/2017    Overview:  Emergency type I dissection repair 8/1/16 at [facility] in [address] [place] [address] Atrial fibrillation [facility])     Atrial fibrillation with rapid ventricular response ([address] 3/11/2017    Chronic diastolic congestive heart failure ([address] 3/29/2017    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 Type A thoracic aortic dissection and aneurysm repair by Dr. Sergio Ferguson at [facility] and vascular [facility] on August 1, 2016.  Hypertension     Intermittent complete heart block ([facility] 8/25/2018    Psychiatric problem     Renal artery stenosis ([address] 10/10/2016    Renal failure     S/P knee replacement 12/12/2014    S/P thoracic aortic aneurysm repair 11/12/2016    Secondary hyperparathyroidism, renal ([address] 7/19/2018    Thoracic aortic dissection ([address] 10/10/2016    Overview:  Type A thoracic aortic dissection and aneurysm repair by Dr. Sergio Ferguson at [facility] heart and vascular [facility] on August 1, 2016.  Urinary retention with incomplete bladder emptying 3/19/2017    Volume overload 10/12/2016        Past Surgical History     Past Surgical History:   Procedure Laterality Date    ABDOMINAL AORTIC ANEURYSM REPAIR      disected     HAND SURGERY      left hand        Medications Prior to Admission     Prior to Admission medications    Medication Sig Start Date End Date Taking? Authorizing Provider   furosemide (LASIX) 20 MG tablet Take 20 mg by mouth 2 times daily   Yes Historical Provider, MD   QUEtiapine (SEROQUEL) 200 MG tablet Take 200 mg by mouth nightly   Yes Historical Provider, MD   tranylcypromine (PARNATE) 10 MG tablet Take 20 mg by mouth 2 times daily   Yes Historical Provider, MD   apixaban (ELIQUIS) 2.5 MG TABS tablet Take 2.5 mg by mouth 2 times daily    Yes Historical Provider, MD   acetaminophen (TYLENOL) 500 MG tablet Take 500-1,000 mg by mouth every 6 hours as needed for Pain    Yes Historical Provider, MD   atorvastatin (LIPITOR) 10 MG tablet Take 10 mg by mouth nightly    Yes Historical Provider, MD   metoprolol (TOPROL-XL) 25 MG XL tablet Take 25 mg by mouth 2 times daily    Yes Historical Provider, MD        Allergies   Dabigatran etexilate mesylate; Demerol hcl [meperidine];  Lithium; Pcn [penicillins]; and Dabigatran    Social History     Social History     Tobacco History     Smoking Status  Never Smoker    Smokeless Tobacco Use  Never Used          Alcohol History     Alcohol Use Status  Yes Drinks/Week  6 Glasses of wine per week Amount  6.0 standard drinks of alcohol/wk Comment  5-6 drinks weekly          Drug Use     Drug Use Status  No          Sexual Activity     Sexually Active  Not Asked                Family History     Family History   Problem Relation Age of Onset    Alcohol Abuse Son        Review of Systems   Review of Systems mentioned in HPI, other review of system is negative    Physical Exam   BP (!) 153/119   Pulse 91   Temp 97.7 °F (36.5 °C) (Oral)   Resp 20   SpO2 99%     Physical Exam  Constitutional:       Appearance: He is ill-appearing. HENT:      Head: Normocephalic and atraumatic. Neck:      Vascular: JVD present. Cardiovascular:      Rate and Rhythm: Normal rate. Rhythm irregular. Pulmonary:      Effort: Pulmonary effort is normal.      Breath sounds: Rales present. Abdominal:      General: Abdomen is flat. Palpations: Abdomen is soft. Musculoskeletal:      Comments: No hip tenderness, no bony tenderness  Moving lower and upper extremities without any pain   Skin:     General: Skin is warm and dry. Capillary Refill: Capillary refill takes less than 2 seconds. Neurological:      General: No focal deficit present. Mental Status: He is alert. He is disoriented. Comments: Confused    Psychiatric:      Comments: Anxious          Labs      Recent Results (from the past 24 hour(s))   EKG 12 Lead    Collection Time: 11/11/20  1:56 AM   Result Value Ref Range    Ventricular Rate 74 BPM    Atrial Rate 45 BPM    QRS Duration 96 ms    Q-T Interval 408 ms    QTc Calculation (Bazett) 452 ms    R Axis 87 degrees    T Axis 2 degrees    Diagnosis       EKG performed in ER and to be interpreted by ER physician. Confirmed by MD, ER ([id number]),  Corita Rubinstein ([id number]) on 11/11/2020 5:56:22 AM   CBC auto differential    Collection Time: 11/11/20  2:09 AM   Result Value Ref Range    WBC 5.5 4.0 - 11.0 K/uL    RBC 3.32 (L) 4.20 - 5.90 M/uL    Hemoglobin 10.7 (L) 13.5 - 17.5 g/dL    Hematocrit 32.8 (L) 40.5 - 52.5 %    MCV 98.9 80.0 - 100.0 fL    MCH 32.3 26.0 - 34.0 pg    MCHC 32.7 31.0 - 36.0 g/dL    RDW 18.5 (H) 12.4 - 15.4 %    Platelets 78 (L) 703 - 450 K/uL    MPV 10.8 (H) 5.0 - 10.5 fL    PLATELET SLIDE REVIEW Decreased     Neutrophils % 73.0 %    Lymphocytes % 12.5 %    Monocytes % 12.6 %    Eosinophils % 1.0 %    Basophils % 0.9 %    Neutrophils Absolute 4.0 1.7 - 7.7 K/uL    Lymphocytes Absolute 0.7 (L) 1.0 - 5.1 K/uL    Monocytes Absolute 0.7 0.0 - 1.3 K/uL    Eosinophils Absolute 0.1 0.0 - 0.6 K/uL    Basophils Absolute 0.0 0.0 - 0.2 K/uL    Anisocytosis 1+ (A)     Macrocytes 1+ (A)     Polychromasia Occasional (A)     Poikilocytes Occasional (A)     Ovalocytes Occasional (A)    Basic Metabolic Panel (EP - 1)    Collection Time: 11/11/20  2:09 AM   Result Value Ref Range    Sodium 135 (L) 136 - 145 mmol/L    Potassium 3.6 3.5 - 5.1 mmol/L    Chloride 98 (L) 99 - 110 mmol/L    CO2 24 21 - 32 mmol/L    Anion Gap 13 3 - 16    Glucose 113 (H) 70 - 99 mg/dL    BUN 43 (H) 7 - 20 mg/dL    CREATININE 3.6 (H) 0.8 - 1.3 mg/dL    GFR Non- 16 (A) >60    GFR  20 (A) >60    Calcium 9.0 8.3 - 10.6 mg/dL   Magnesium    Collection Time: 11/11/20  2:09 AM   Result Value Ref Range    Magnesium 2.30 1.80 - 2.40 mg/dL   Phosphorus    Collection Time: 11/11/20  2:09 AM   Result Value Ref Range    Phosphorus 3.4 2.5 - 4.9 mg/dL   Troponin    Collection Time: 11/11/20  2:09 AM   Result Value Ref Range    Troponin 0.05 (H) <0.01 ng/mL   Brain Natriuretic Peptide    Collection Time: 11/11/20  2:09 AM   Result Value Ref Range    Pro-BNP 21,891 (H) 0 - 449 pg/mL   Troponin    Collection Time: 11/11/20  6:13 AM   Result Value Ref Range    Troponin 0.04 (H) <0.01 ng/mL   Urinalysis, reflex to microscopic (Lab)    Collection Time: 11/11/20  6:27 AM Result Value Ref Range    Color, UA Yellow Straw/Yellow    Clarity, UA Clear Clear    Glucose, Ur Negative Negative mg/dL    Bilirubin Urine Negative Negative    Ketones, Urine Negative Negative mg/dL    Specific Gravity, UA 1.020 1.005 - 1.030    Blood, Urine TRACE-INTACT (A) Negative    pH, UA 6.0 5.0 - 8.0    Protein, UA TRACE (A) Negative mg/dL    Urobilinogen, Urine 0.2 <2.0 E.U./dL    Nitrite, Urine Negative Negative    Leukocyte Esterase, Urine Negative Negative    Microscopic Examination YES     Urine Type NotGiven    Microscopic Urinalysis    Collection Time: 11/11/20  6:27 AM   Result Value Ref Range    WBC, UA 0-2 0 - 5 /HPF    RBC, UA None seen 0 - 4 /HPF        Imaging/Diagnostics Last 24 Hours   Ct Head Wo Contrast    Result Date: 11/11/2020  Patient: Milon Push  Time Out: 03:07 Exam(s): CT HEAD Without Contrast  EXAM:   CT Head Without Intravenous Contrast  CLINICAL HISTORY:   fall. TECHNIQUE:   Axial computed tomography images of the head/brain without intravenous contrast.  CTDI is 67.82 mGy and DLP is 1220.41 mGy-cm. All CT scans at this facility use dose modulation, iterative reconstruction, and/or weight based dosing when appropriate to reduce radiation dose to as low as reasonably achievable. COMPARISON:   No relevant prior studies available. FINDINGS:   Brain:  No acute infarct, hemorrhage, mass or edema. Chronic small vessel ischemic disease and senescent changes. Ventricles:  Unremarkable. No ventriculomegaly. Bones/joints:  Unremarkable. No acute calvarial fracture. Soft tissues:  Unremarkable. Sinuses:  Mild mucosal thickening in the paranasal sinuses. Mastoid air cells:  Unremarkable as visualized. Electronically signed by Mahad Foy MD on 11-11-20 at 9119      No acute infarct, hemorrhage, mass or edema.       Ct Cervical Spine Wo Contrast    Result Date: 11/11/2020  Patient: Milon Push  Time Out: 03:09 Exam(s): CT C SPINE  EXAM:   CT Cervical Spine Without Intravenous Contrast  CLINICAL HISTORY:   fall, neck pain. TECHNIQUE:   Axial computed tomography images of the cervical spine without intravenous contrast.  CTDI is 17.70 mGy and DLP is 400.61 mGy-cm. All CT scans at this facility use dose modulation, iterative reconstruction, and/or weight based dosing when appropriate to reduce radiation dose to as low as reasonably achievable. COMPARISON:   No relevant prior studies available. FINDINGS:   Vertebrae:  No acute fracture or traumatic malalignment. Discs/spinal canal/neural foramina:  Multilevel degenerative changes. Soft tissues:  Unremarkable. Electronically signed by Jean Paul Tucker MD on 11-11-20 at 0309      No acute findings in the cervical spine. Xr Chest Portable    Result Date: 11/11/2020  Patient: Kaen Arita  Time Out: 02:58 Exam(s): FILM CXR 1 VIEW  EXAM:   XR Chest, 1 View  CLINICAL HISTORY:   hypoxia. TECHNIQUE:   Frontal view of the chest.  COMPARISON:   Chest x-ray dated 2/20/2019  FINDINGS:   Lungs:  Elevated right hemidiaphragm with adjacent atelectasis. Otherwise, lungs are clear. Pleural space:  Unremarkable. Heart:  Heart is enlarged. Mediastinum:  Unremarkable. Bones/joints:  Evidence of prior median sternotomy. Soft tissues:  Surgical clips within the right axilla. Vasculature: Tortuosity of the thoracic aorta. Tubes, lines and devices:  Single lead cardiac pacemaker. Electronically signed by Jean Paul Tucker MD on 11-11-20 at [phone]      No acute findings in the chest.        Assessment      Acute congestive heart failure with preserved EF  MAHTEW on CKD  Age-related debility  Atrial fibrillation  Thrombocytopenia, chronic  Plan   1. Admit to telemetry, MedSur.  2. S/p Lasix in ER, consult nephrology, may require Lasix, might be having cardiorenal syndrome. 3. Continue home beta-blockade and Eliquis. 4. Continue home Seroquel. 5. PT OT  6. May need placement.        Consultations Ordered:  IP CONSULT TO HOSPITALIST  IP CONSULT TO NEPHROLOGY  IP CONSULT TO CARDIOLOGY    Electronically signed by Ray Colin MD on 11/11/20 at 12:43 PM EST

## 2020-11-11 NOTE — ED NOTES
Patient's room air oxygen saturation is 85%. Patient placed on 3L nasal cannula O2. Patient now at 97%.      Sandy Kincaid RN  11/11/20 1727

## 2020-11-11 NOTE — ED NOTES
Home Med List is complete. Patient unable to answer questions. Called wife at home, [phone]. She states that he takes care of his own medications, and she does not know what he is taking. She recognized a few names (Eliquis, metoprolol, and confirmed no ASA), but otherwise felt uncomfortable to say what he was doing at home. She knows he takes APAP prn, and denies other OTC meds. [address] per wife request, [phone]. All Rx medications are from [facility] records. Please note:   1. Furosemide -- patient is about a week late on refilling this. Added to home med list at this point as unable to confirm with patient.           Johan FloresD., BCPS   11/11/2020 10:35 AM  Wireless: [phone]

## 2020-11-11 NOTE — ED NOTES
Pt attempting to get OOB. Returned to bed and reoriented to place and time. Daughter to bedside. Pt attempted to void per urinal but spilled it on the bed. Linen change performed and chavo care.  Adult diaper placed on patient     Shay Rios RN  11/11/20 1145

## 2020-11-11 NOTE — CARE COORDINATION
Medications:    Does Patient want to participate in local refill/ meds to beds program?:      Meds To Beds General Rules:  1. Can ONLY be done Monday- Friday between 8:30am-5pm  2. Prescription(s) must be in pharmacy by 3pm to be filled same day  3. Copy of patient's insurance/ prescription drug card and patient face sheet must be sent along with the prescription(s)  4. Cost of Rx cannot be added to hospital bill. If financial assistance is needed, please contact unit  or ;  or  CANNOT provide pharmacy voucher for patients co-pays  5.  Patients can then  the prescription on their way out of the hospital at discharge, or pharmacy can deliver to the bedside if staff is available. (payment due at time of pick-up or delivery - cash, check, or card accepted)     Able to afford home medications/ co-pay costs: Yes    ADLS  Support Systems:      PT AM-PAC:   /24  OT AM-PAC:   /24    [place]: home with wife  Steps:     Plans to RETURN to current housing: Yes  Barriers to RETURNING to current housing: medical complications, confusion, weakness    Home Care Information  Currently ACTIVE with [address]: No  Home Care Agency: Not Applicable    Currently ACTIVE with [place] on Aging: No        Durable Medical Equipment  DME Provider:   Equipment: none noted    Home Oxygen and Respiratory Equipment  Has HOME OXYGEN prior to admission: Yes  [address]: [facility]  Phone: [phone]       DISCHARGE PLAN:  Disposition: Home with [facility] [address]: TBD     Transportation PLAN for discharge: family     Factors facilitating achievement of predicted outcomes: Family support, Motivated, Cooperative and Pleasant    Barriers to discharge: Cognitive deficit, Impulsivity, Limited safety awareness and Medical complications    Additional Case Management Notes: see above    The Plan for Transition of Care is related to the following treatment goals of CHF (congestive heart failure), NYHA class I, acute on chronic, combined ([facility]) [I50.43]  CHF (congestive heart failure), NYHA class I, acute on chronic, combined ([facility] [I50.43]    The Patient and/or patient representative Pam Freitas and his family were provided with a choice of provider and agrees with the discharge plan Not Indicated    Freedom of choice list was provided with basic dialogue that supports the patient's individualized plan of care/goals and shares the quality data associated with the providers.  Not Indicated    Care Transition patient: KI Redd, CURT  [facility]  Case Management Department  Ph: [phone]

## 2020-11-11 NOTE — ED NOTES
Report called to nAnie on inpatient unit.  Pt transported to [facility] via stretcher in [address], RN  11/11/20 [address] [facility] [address]

## 2020-11-11 NOTE — ED NOTES
Patient was set up to eat breakfast by medic. He at approx 40-50% of his meal. TV channel adjusted to patient preference. Patient resting in bed, watching tv.       Mabel Golden RN  11/11/20 1022

## 2020-11-11 NOTE — CONSULTS
Cardiology EMR Consultation / Progress Note                                                                   Pt Name: Avelina Canela  Age: 79 y.o. Sex: male  : 1940  Location: [place] [address]    Referring Physician: Flaquita Srinivasan MD  Primary cardiologist: Dr Surya Bennett at [facility]      Reason for Consult:     Reason for Consultation/Chief Complaint: hypoxia    HPI:      Avelina Canela is a 79 y.o. male with a past medical history of repaired TAA in 2016, CAF, moderate AS and MR. Patient presented to the emergency room on  with increasing confusion over the last few days. He was found to be hypoxic on arrival with oxygen saturation in the 80s, adequate saturation on 3 L of supplemental oxygen. Otherwise patient was afebrile and vital signs stable. Creatinine 3.6 (was 2.7 in 2019), proBNP 21,000, troponin 0.04, hemoglobin 10.7, platelets 78, normal WBC, CT head and cervical spine unremarkable, chest x-ray within normal limits, ECG consistent with A. fib, 74 bpm, nonspecific changes. Echo 2018: Normal LV size and LVEF 55-60%, indeterminate diastolic, moderate aortic stenosis, moderate mitral regurgitation, left atrial enlargement, moderate RV dilatation with normal function, RVSP 65 (RAP 15). Patient was admitted for possible acute on chronic HFpEF, MATHEW on CKD. Cardiology was consulted for possible acute heart failure.       Histories     Past Medical History:   has a past medical history of AAA (abdominal aortic aneurysm) ([address] AAA (abdominal aortic aneurysm) without rupture ([address] Aortic dissection, thoracic ([address] Aortic dissection, thoracic ([address] Atrial fibrillation ([address] Atrial fibrillation with rapid ventricular response ([address] Chronic diastolic congestive heart failure ([address] Critical illness myopathy, Hx of repair of dissecting thoracic aortic aneurysm, [place] type A, Hypertension, Intermittent complete heart block [facility]), Psychiatric problem, Renal artery stenosis ([address] Renal failure, S/P knee replacement, S/P thoracic aortic aneurysm repair, Secondary hyperparathyroidism, renal ([address] Thoracic aortic dissection ([address] Urinary retention with incomplete bladder emptying, and Volume overload. Surgical History:   has a past surgical history that includes Hand surgery and Abdominal aortic aneurysm repair. Social History:   reports that he has never smoked. He has never used smokeless tobacco. He reports current alcohol use of about 6.0 standard drinks of alcohol per week. He reports that he does not use drugs. Family History:  No evidence for sudden cardiac death or premature CAD      Medications:       Home Medications  Were reviewed and are listed in nursing record. and/or listed below  Prior to Admission medications    Medication Sig Start Date End Date Taking?  Authorizing Provider   furosemide (LASIX) 20 MG tablet Take 20 mg by mouth 2 times daily   Yes Historical Provider, MD   QUEtiapine (SEROQUEL) 200 MG tablet Take 200 mg by mouth nightly   Yes Historical Provider, MD   tranylcypromine (PARNATE) 10 MG tablet Take 20 mg by mouth 2 times daily   Yes Historical Provider, MD   apixaban (ELIQUIS) 2.5 MG TABS tablet Take 2.5 mg by mouth 2 times daily    Yes Historical Provider, MD   acetaminophen (TYLENOL) 500 MG tablet Take 500-1,000 mg by mouth every 6 hours as needed for Pain    Yes Historical Provider, MD   atorvastatin (LIPITOR) 10 MG tablet Take 10 mg by mouth nightly    Yes Historical Provider, MD   metoprolol (TOPROL-XL) 25 MG XL tablet Take 25 mg by mouth 2 times daily    Yes Historical Provider, MD          Inpatient Medications:   apixaban  2.5 mg Oral BID    atorvastatin  10 mg Oral Nightly    QUEtiapine  200 mg Oral Nightly    sodium chloride flush  10 mL Intravenous 2 times per day    metoprolol succinate  50 mg Oral BID       IV drips:      PRN:  sodium chloride flush, acetaminophen    Allergy:     Dabigatran etexilate mesylate; Demerol hcl [meperidine]; Lithium; Pcn [penicillins]; and Dabigatran           Physical Examination:     Vitals:    11/11/20 1312 11/11/20 1330 11/11/20 1430 11/11/20 1630   BP: (!) 170/127 (!) 154/102 (!) 156/116 (!) 167/123   Pulse: 88 89 92 86   Resp:  13 24 20   Temp:       TempSrc:       SpO2:  (!) 86% 100% 92%       Wt Readings from Last 3 Encounters:   03/03/19 178 lb 6.4 oz (80.9 kg)   04/23/16 210 lb (95.3 kg)   03/17/14 208 lb (94.3 kg)        Labs:     Recent Labs     11/11/20  0209   *   K 3.6   BUN 43*   CREATININE 3.6*   CL 98*   CO2 24   GLUCOSE 113*   CALCIUM 9.0   MG 2.30     Recent Labs     11/11/20  0209   WBC 5.5   HGB 10.7*   HCT 32.8*   PLT 78*   MCV 98.9     No results for input(s): CHOLTOT, TRIG, HDL in the last 72 hours. Invalid input(s): LIPIDCOMM, CHOLHDL, VLDCHOL, LDL  No results for input(s): PTT, INR in the last 72 hours. Invalid input(s): PT  Recent Labs     11/11/20  0209 11/11/20  0613   TROPONINI 0.05* 0.04*     No results for input(s): BNP in the last 72 hours. No results for input(s): TSH in the last 72 hours. No results for input(s): CHOL, HDL, LDLCALC, TRIG in the last 72 hours.]    Lab Results   Component Value Date    TROPONINI 0.04 (H) 11/11/2020         Imaging:         Assessment / Plan:     1. Acute hypoxic respiratory failure: At this time it is difficult to determine if hypoxia is due to acute heart failure versus infection (including Covid infection). -Please rule out COVID. -Patient received Lasix 80 mg IV x1 at the emergency room; will continue with Lasix 40 mg IV twice daily for presumptive acute heart failure. - Strict I's and O's every shift and standing weights if possible, low-salt diet and daily BMP. -Will repeat echo (after rule out COVID)    2. Chronic A. Fib:  Patient remains in atrial fibrillation with rate control.    -Continue with Toprol 50 mg twice daily and Eliquis 2.5 twice daily    3. Aortic stenosis:   It is moderate per echo in 2018.    -Will reassess with echocardiogram    4. Mitral regurgitation: It is moderate per echo in 2018.    -We will reassess with echocardiogram          Due to limiting exposure to COVID-19 and minimizing use of PPE, note was completed solely using EMR and from personal conversations with primary medical team and/or consultants involved in case. Patient was not interviewed or examined. I have personally reviewed the reports and images of labs, radiological studies, cardiac studies including ECG's and telemetry, current and old medical records. The note was completed using EMR and Dragon dictation system. Every effort was made to ensure accuracy; however, inadvertent computerized transcription errors may be present. CODES 75984 (up to 30 min), 92688 (>30 min). I have spent 40 minutes reviewing EMR as above and formulating my assessment and plan. I would like to thank you for providing me the opportunity to participate in the care of your patient. If you have any questions, please do not hesitate to contact me.      Idalia Crow MD, [facility] [place], [address]  The [address]  [facility] [place] [address] Tori Khan [address]  Ph: [phone]  Fax: [phone]

## 2020-11-11 NOTE — ED PROVIDER NOTES
[address] [facility]          ATTENDING PHYSICIAN NOTE       Date of evaluation: 11/11/2020    Chief Complaint     Fall and Altered Mental Status (for the past few days per wife to EMS, but even more so when he takes his sleeping pills)      History of Present Illness     Twin Wilks is a 79 y.o. male who presents to the emergency department for a fall. Patient has a history of atrial fibrillation and reportedly fell at home. The patient is an unclear historian and wife told EMS that the patient has had increasing confusion over the last several days, but also notes that he does have some confusion with taking his night medications. The patient otherwise has no complaints at this time. On arrival, the patient was noted to be hypoxic with oxygen saturations in the high 80s on room air. Review of Systems     Review of Systems   Unable to perform ROS: Mental status change       Past Medical, Surgical, Family, and Social History     He has a past medical history of AAA (abdominal aortic aneurysm) ([address] AAA (abdominal aortic aneurysm) without rupture ([address] Aortic dissection, thoracic ([address] Aortic dissection, thoracic ([address] Atrial fibrillation ([address] Atrial fibrillation with rapid ventricular response ([address] Chronic diastolic congestive heart failure ([address] Critical illness myopathy, Hx of repair of dissecting thoracic aortic aneurysm, [place] type A, Hypertension, Intermittent complete heart block ([address] Psychiatric problem, Renal artery stenosis ([address] Renal failure, S/P knee replacement, S/P thoracic aortic aneurysm repair, Secondary hyperparathyroidism, renal ([address] Thoracic aortic dissection ([address] Urinary retention with incomplete bladder emptying, and Volume overload. He has a past surgical history that includes Hand surgery and Abdominal aortic aneurysm repair. His family history includes Alcohol Abuse in his son. He reports that he has never smoked.  He has never used smokeless tobacco. He reports current alcohol use of about 6.0 standard drinks of alcohol per week. He reports that he does not use drugs. Medications     Previous Medications    ACETAMINOPHEN (TYLENOL) 500 MG TABLET    Take 500 mg by mouth every 6 hours as needed for Pain    APIXABAN (ELIQUIS) 2.5 MG TABS TABLET    Take by mouth 2 times daily    ATORVASTATIN (LIPITOR) 10 MG TABLET    Take 10 mg by mouth nightly     CALCIFEDIOL ER 30 MCG CPCR    Take 30 mcg by mouth daily    FERROUS SULFATE 325 (65 FE) MG TABLET    Take 325 mg by mouth daily (with breakfast)    FUROSEMIDE (LASIX) 20 MG TABLET    Take 2 tablets by mouth 2 times daily    GUAIFENESIN (MUCINEX) 600 MG EXTENDED RELEASE TABLET    Take 600 mg by mouth 2 times daily as needed for Congestion    METOPROLOL (TOPROL-XL) 25 MG XL TABLET    Take 25 mg by mouth 2 times daily     POLYETHYL GLYCOL-PROPYL GLYCOL 0.4-0.3 % (SYSTANE) 0.4-0.3 % OPHTHALMIC SOLUTION    Place 1 drop into both eyes as needed for Dry Eyes    QUETIAPINE (SEROQUEL) 300 MG TABLET    Take 300 mg by mouth nightly    TRANYLCYPROMINE (PARNATE) 10 MG TABLET    Take 10 mg by mouth 2 times daily 3 in the a.m. And 1 at noon       Allergies     He is allergic to dabigatran etexilate mesylate; demerol hcl [meperidine]; lithium; pcn [penicillins]; and dabigatran. Physical Exam     INITIAL VITALS: BP: (!) 124/98, Temp: 98.5 °F (36.9 °C), Pulse: 87, Resp: 22, SpO2: 100 %   Physical Exam  Vitals signs and nursing note reviewed. Constitutional:       General: He is not in acute distress. HENT:      Head: Normocephalic and atraumatic. Mouth/Throat:      Mouth: Mucous membranes are moist.      Pharynx: No oropharyngeal exudate. Eyes:      General: No scleral icterus. Extraocular Movements: Extraocular movements intact. Conjunctiva/sclera: Conjunctivae normal.      Pupils: Pupils are equal, round, and reactive to light.    Neck:      Musculoskeletal: Normal range of motion and neck supple. Cardiovascular:      Rate and Rhythm: Normal rate and regular rhythm. Heart sounds: Murmur present. Pulmonary:      Effort: Pulmonary effort is normal.      Breath sounds: Normal breath sounds. No wheezing, rhonchi or rales. Abdominal:      General: Bowel sounds are normal.      Palpations: Abdomen is soft. Tenderness: There is no abdominal tenderness. There is no guarding or rebound. Musculoskeletal: Normal range of motion. Skin:     General: Skin is warm and dry. Comments: Skin tears present over the olecranon process of the left elbow as well as the dorsal aspect of the left hand along the webspace between the first and second digits. Neurological:      General: No focal deficit present. Mental Status: He is alert. Cranial Nerves: No cranial nerve deficit. Motor: No weakness. Coordination: Coordination normal.      Comments: Patient oriented to self and place and somewhat to situation, but not to time. Diagnostic Results     EKG   EKG is interpreted by me shows patient to be in atrial fibrillation with a rate of 74, normal axis, normal QT interval, normal QRS duration, no ST segment abnormalities, no T wave abnormalities. RADIOLOGY:  CT CERVICAL SPINE WO CONTRAST   Final Result     No acute findings in the cervical spine. CT HEAD WO CONTRAST   Final Result     No acute infarct, hemorrhage, mass or edema.           XR CHEST PORTABLE   Final Result     No acute findings in the chest.              LABS:   Results for orders placed or performed during the hospital encounter of 11/11/20   CBC auto differential   Result Value Ref Range    WBC 5.5 4.0 - 11.0 K/uL    RBC 3.32 (L) 4.20 - 5.90 M/uL    Hemoglobin 10.7 (L) 13.5 - 17.5 g/dL    Hematocrit 32.8 (L) 40.5 - 52.5 %    MCV 98.9 80.0 - 100.0 fL    MCH 32.3 26.0 - 34.0 pg    MCHC 32.7 31.0 - 36.0 g/dL    RDW 18.5 (H) 12.4 - 15.4 %    Platelets 78 (L) 827 - 450 K/uL    MPV 10.8 (H) 5.0 - 10.5 fL    PLATELET SLIDE REVIEW Decreased     Neutrophils % 73.0 %    Lymphocytes % 12.5 %    Monocytes % 12.6 %    Eosinophils % 1.0 %    Basophils % 0.9 %    Neutrophils Absolute 4.0 1.7 - 7.7 K/uL    Lymphocytes Absolute 0.7 (L) 1.0 - 5.1 K/uL    Monocytes Absolute 0.7 0.0 - 1.3 K/uL    Eosinophils Absolute 0.1 0.0 - 0.6 K/uL    Basophils Absolute 0.0 0.0 - 0.2 K/uL    Anisocytosis 1+ (A)     Macrocytes 1+ (A)     Polychromasia Occasional (A)     Poikilocytes Occasional (A)     Ovalocytes Occasional (A)    Basic Metabolic Panel (EP - 1)   Result Value Ref Range    Sodium 135 (L) 136 - 145 mmol/L    Potassium 3.6 3.5 - 5.1 mmol/L    Chloride 98 (L) 99 - 110 mmol/L    CO2 24 21 - 32 mmol/L    Anion Gap 13 3 - 16    Glucose 113 (H) 70 - 99 mg/dL    BUN 43 (H) 7 - 20 mg/dL    CREATININE 3.6 (H) 0.8 - 1.3 mg/dL    GFR Non- 16 (A) >60    GFR  20 (A) >60    Calcium 9.0 8.3 - 10.6 mg/dL   Magnesium   Result Value Ref Range    Magnesium 2.30 1.80 - 2.40 mg/dL   Phosphorus   Result Value Ref Range    Phosphorus 3.4 2.5 - 4.9 mg/dL   Troponin   Result Value Ref Range    Troponin 0.05 (H) <0.01 ng/mL   Brain Natriuretic Peptide   Result Value Ref Range    Pro-BNP 21,891 (H) 0 - 449 pg/mL     RECENT VITALS:  BP: (!) 124/98,Temp: 98.5 °F (36.9 °C), Pulse: 85, Resp: 17, SpO2: 100 %     Procedures     Large skin tear on left hand repaired using tissue adhesive, 2 other skin tears on the left hand and 1 skin tear on the left elbow will heal by secondary intention. ED Course     Nursing Notes, Past Medical Hx, Past Surgical Hx, Social Hx,Allergies, and Family Hx were reviewed.     patient was given the following medications:  Orders Placed This Encounter   Medications    0.9 % sodium chloride bolus    neomycin-bacitracin-polymyxin (NEOSPORIN) ointment    furosemide (LASIX) injection 80 mg    haloperidol lactate (HALDOL) injection 2 mg       CONSULTS:  IP CONSULT TO HOSPITALIST    MEDICAL discussed case with the hospitalist service and the patient will be admitted for further evaluation. Clinical Impression     1. Acute on chronic congestive heart failure, unspecified heart failure type ([address]    2. Altered mental status, unspecified altered mental status type        Disposition     PATIENT REFERRED TO:  No follow-up provider specified.     DISCHARGE MEDICATIONS:  New Prescriptions    No medications on file       0923 Royal Penn MD  11/11/20 7730

## 2020-11-11 NOTE — CONSULTS
[facility] NEPHROLOGY    [facility]              [phone]                      Ash Mcleod is admitted with CHF. We are following for CKD with elevated Cr. Interval History and plan:      Patient was seen and examined at bedside this AM. He is a patient of Dr. Sharath Adkins. 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 Patient has left AMA in previous admission. He denies any chest pain, SOB, fever, chills, nausea, vomiting. He says he visited his doctor last month at the [facility]. Patient is altered, so the information he provided is unreliable. CT head, spine and CXR negative. Currently on 3 L O2. Patient is hypertensive. Assessment :     CKD IV -V  Cause of CKD from HTN, Lithium use, R renal artery stenosis, NSAID use and Cardiorenal syndrome. Cr today - 3.6. Baseline last year 2.7-2.8. Currently doesn't seem to be volume overloaded but Pro-BNP is 21,891. No crackles or edema on examination. He received 80 mg lasix IV in the ED. US from 2019 shows mass in R kidney, L kidney with cyst. His current creatinine seems to be his new baseline  from progression of his CKD. · Will order renal ultrasound to assess for renal mass and cyst   · Will order PTH, Vitamin D and phosphorous  · Avoid nephrotoxic agents      Hypertension   Patient is hypertensive. ·  Increase dose of metoprolol     Atrial fibrillation   · At home taking Metoprolol 25 mg BID. Will increase the dose for hypertension. · Apixaban 2.5 mg BID     [facility] Nephrology would like to thank Bryanna Bryant MD   for opportunity to serve this patient      Please call with questions at-   24 Hrs Answering service [phone] or  7 am- 5 pm via Perfect serve or cell phone  Arpita Jauregui         CC/reason for consult :     CKD        HPI :     Montrell Garcia is a 79 y.o. male presented to   the hospital on 11/11/2020 with fall and AMS.   Patient has a past medical history of a-fib, CHF, COPD, AAA. The patient is an unclear historian and wife told EMS that the patient has had increasing confusion over the last several days, but also notes that he does have some confusion with taking his night medications. The patient otherwise has no complaints at this time. On arrival, the patient was noted to be hypoxic with oxygen saturations in the high 80s on room air. ROS:     positives in bold   Constitutional:  fever, chills, weakness, weight change, fatigue  Skin:  rash, pruritus, hair loss, bruising, dry skin, petechiae  Head, Face, Neck   headaches, swelling,  cervical adenopathy  Respiratory: shortness of breath, cough, or wheezing  Cardiovascular: chest pain, palpitations, dizzy, edema  Gastrointestinal: nausea, vomiting, diarrhea, constipation,belly pain    Yellow skin, blood in stool  Musculoskeletal:  back pain, muscle weakness, gait problems,       joint pain or swelling. Genitourinary:  dysuria, poor urine flow, flank pain, blood in urine  Neurologic:  vertigo, TIA'S, syncope, seizures, focal weakness  Psychosocial:  insomnia, anxiety, or depression.                       All other remaining systems are negative or unable to obtain        PMH/PSH/SH/Family History:     Past Medical History:   Diagnosis Date    AAA (abdominal aortic aneurysm) ([address]     AAA (abdominal aortic aneurysm) without rupture ([address] 11/29/2017    Aortic dissection, thoracic ([address] 3/20/2017    Overview:  Emergency type I dissection repair 8/1/16 at [facility] in [address] [place] [address] Aortic dissection, thoracic [facility]) 3/20/2017    Overview:  Emergency type I dissection repair 8/1/16 at [facility] in [address] [place] [address] Atrial fibrillation [facility])     Atrial fibrillation with rapid ventricular response ([address] 3/11/2017    Chronic diastolic congestive heart failure ([address] 3/29/2017    Critical illness myopathy 9/15/2016    Hx of repair of dissecting thoracic aortic aneurysm, [place] type A 10/10/2016 Emergency type I dissection repair 8/1/16 at [facility] in [facility] Type A thoracic aortic dissection and aneurysm repair by Dr. Segundo Villa at [facility] heart and vascular [place], [place] on August 1, 2016.  Hypertension     Intermittent complete heart block ([address] 8/25/2018    Psychiatric problem     Renal artery stenosis ([address] 10/10/2016    Renal failure     S/P knee replacement 12/12/2014    S/P thoracic aortic aneurysm repair 11/12/2016    Secondary hyperparathyroidism, renal ([address] 7/19/2018    Thoracic aortic dissection ([address] 10/10/2016    Overview:  Type A thoracic aortic dissection and aneurysm repair by Dr. Segundo Villa at [facility] heart and vascular [place], [place] on August 1, 2016.  Urinary retention with incomplete bladder emptying 3/19/2017    Volume overload 10/12/2016       Past Surgical History:   Procedure Laterality Date    ABDOMINAL AORTIC ANEURYSM REPAIR      disected     HAND SURGERY      left hand        reports that he has never smoked. He has never used smokeless tobacco. He reports current alcohol use of about 6.0 standard drinks of alcohol per week. He reports that he does not use drugs. family history includes Alcohol Abuse in his son.          Medication:     Current Facility-Administered Medications: apixaban (ELIQUIS) tablet 2.5 mg, 2.5 mg, Oral, BID  atorvastatin (LIPITOR) tablet 10 mg, 10 mg, Oral, Nightly  metoprolol succinate (TOPROL XL) extended release tablet 25 mg, 25 mg, Oral, BID  QUEtiapine (SEROQUEL) tablet 300 mg, 300 mg, Oral, Nightly  sodium chloride flush 0.9 % injection 10 mL, 10 mL, Intravenous, 2 times per day  sodium chloride flush 0.9 % injection 10 mL, 10 mL, Intravenous, PRN  acetaminophen (TYLENOL) tablet 650 mg, 650 mg, Oral, Q4H PRN       Vitals :     Vitals:    11/11/20 0700   BP: 111/70   Pulse:    Resp:    Temp:    SpO2: 100%       I & O :     No intake or output data in the 24 hours ending 11/11/20 0837     Physical Examination :     General appearance: Anxious- no, distressed- no, in good spirits- yes    HEENT: Lips- normal, teeth- ok , oral mucosa- moist  Neck : Mass- no, appears symmetrical, JVD- not visible  Respiratory: Respiratory effort- normal  Cardiovascular: Ausculation- systolic murmur   Abdomen: visible mass- no, distention- no, scar- no, tenderness- no                            hepatosplenomegaly-  no  Musculoskeletal:  clubbing no,cyanosis- no , digital ischemia- no                           muscle strength- grossly normal , tone - grossly normal  Skin: rashes- no , ulcers- no, induration- no, tightening - no  Psychiatric:  [id number]       LABS:     Recent Labs     11/11/20  0209   WBC 5.5   HGB 10.7*   HCT 32.8*   PLT 78*     Recent Labs     11/11/20  0209   *   K 3.6   CL 98*   CO2 24   BUN 43*   CREATININE 3.6*   GLUCOSE 113*   MG 2.30   PHOS 3.4      Patient was seen and examined and the case was discussed with the resident. He acted as my scribe. I agree with the assessment and plan.     Thanks  Nephrology  Chriss Aguero [address]  Office: [phone]  Cell: [phone]  Fax: [phone]

## 2020-11-11 NOTE — ED NOTES
Dietary could not arouse patient. Checked on patient and he is resting peacefully. Breakfast ordered for patient.       Luna Paige RN  11/11/20 0101

## 2020-11-11 NOTE — ED NOTES
Patient agitated stating that he is not staying. Patient states that he needs to speak with the doctor and that he refuses to stay. MD notified.      Johnnie Hernandez RN  11/11/20 0754

## 2020-11-11 NOTE — ED NOTES
Pt attempting to get out of bed stating he's leaving. Enc pt to return to bed. Reoriented to place and time.   Moved pt to room 13 for better visualization from [address] [place] [address]  11/11/20 3703

## 2020-11-11 NOTE — CONSULTS
Consult received. Labs and notes were reviewed. Case was discussed with the staff. Full note to follow.     Thanks  Nephrology  Chrsis Aguero [address]  Office: [phone]  Cell: [phone]  Fax: [phone]

## 2020-11-12 LAB
ALBUMIN SERPL-MCNC: 4.4 G/DL (ref 3.4–5)
ANION GAP SERPL CALCULATED.3IONS-SCNC: 14 MMOL/L (ref 3–16)
BUN BLDV-MCNC: 39 MG/DL (ref 7–20)
CALCIUM SERPL-MCNC: 8.9 MG/DL (ref 8.3–10.6)
CHLORIDE BLD-SCNC: 97 MMOL/L (ref 99–110)
CO2: 28 MMOL/L (ref 21–32)
CREAT SERPL-MCNC: 3.1 MG/DL (ref 0.8–1.3)
GFR AFRICAN AMERICAN: 23
GFR NON-AFRICAN AMERICAN: 19
GLUCOSE BLD-MCNC: 88 MG/DL (ref 70–99)
LITHIUM DOSE AMOUNT: ABNORMAL
LITHIUM LEVEL: <0.1 MMOL/L (ref 0.6–1.2)
LV EF: 58 %
LVEF MODALITY: NORMAL
PHOSPHORUS: 3.7 MG/DL (ref 2.5–4.9)
POTASSIUM SERPL-SCNC: 3.3 MMOL/L (ref 3.5–5.1)
SODIUM BLD-SCNC: 139 MMOL/L (ref 136–145)
TSH REFLEX: 3.26 UIU/ML (ref 0.27–4.2)
VITAMIN B-12: 442 PG/ML (ref 211–911)

## 2020-11-12 PROCEDURE — 6360000002 HC RX W HCPCS: Performed by: INTERNAL MEDICINE

## 2020-11-12 PROCEDURE — 6360000004 HC RX CONTRAST MEDICATION: Performed by: INTERNAL MEDICINE

## 2020-11-12 PROCEDURE — C8929 TTE W OR WO FOL WCON,DOPPLER: HCPCS

## 2020-11-12 PROCEDURE — 80178 ASSAY OF LITHIUM: CPT

## 2020-11-12 PROCEDURE — 6370000000 HC RX 637 (ALT 250 FOR IP): Performed by: INTERNAL MEDICINE

## 2020-11-12 PROCEDURE — 94761 N-INVAS EAR/PLS OXIMETRY MLT: CPT

## 2020-11-12 PROCEDURE — 82607 VITAMIN B-12: CPT

## 2020-11-12 PROCEDURE — 2060000000 HC ICU INTERMEDIATE R&B

## 2020-11-12 PROCEDURE — 2580000003 HC RX 258: Performed by: INTERNAL MEDICINE

## 2020-11-12 PROCEDURE — 92610 EVALUATE SWALLOWING FUNCTION: CPT | Performed by: SPEECH-LANGUAGE PATHOLOGIST

## 2020-11-12 PROCEDURE — 36415 COLL VENOUS BLD VENIPUNCTURE: CPT

## 2020-11-12 PROCEDURE — 84443 ASSAY THYROID STIM HORMONE: CPT

## 2020-11-12 PROCEDURE — 80069 RENAL FUNCTION PANEL: CPT

## 2020-11-12 PROCEDURE — 2700000000 HC OXYGEN THERAPY PER DAY

## 2020-11-12 RX ORDER — POTASSIUM CHLORIDE 7.45 MG/ML
10 INJECTION INTRAVENOUS
Status: DISPENSED | OUTPATIENT
Start: 2020-11-12 | End: 2020-11-12

## 2020-11-12 RX ORDER — POTASSIUM CHLORIDE 7.45 MG/ML
10 INJECTION INTRAVENOUS
Status: COMPLETED | OUTPATIENT
Start: 2020-11-12 | End: 2020-11-12

## 2020-11-12 RX ORDER — OLANZAPINE 10 MG/1
2.5 INJECTION, POWDER, LYOPHILIZED, FOR SOLUTION INTRAMUSCULAR 2 TIMES DAILY PRN
Status: DISCONTINUED | OUTPATIENT
Start: 2020-11-12 | End: 2020-11-15 | Stop reason: HOSPADM

## 2020-11-12 RX ADMIN — METOPROLOL SUCCINATE 50 MG: 50 TABLET, EXTENDED RELEASE ORAL at 19:46

## 2020-11-12 RX ADMIN — Medication 10 ML: at 19:47

## 2020-11-12 RX ADMIN — POTASSIUM CHLORIDE 10 MEQ: 7.46 INJECTION, SOLUTION INTRAVENOUS at 12:46

## 2020-11-12 RX ADMIN — ATORVASTATIN CALCIUM 10 MG: 20 TABLET, FILM COATED ORAL at 19:47

## 2020-11-12 RX ADMIN — PERFLUTREN 1.65 MG: 6.52 INJECTION, SUSPENSION INTRAVENOUS at 12:19

## 2020-11-12 RX ADMIN — METOPROLOL SUCCINATE 50 MG: 50 TABLET, EXTENDED RELEASE ORAL at 09:03

## 2020-11-12 RX ADMIN — POTASSIUM CHLORIDE 10 MEQ: 7.46 INJECTION, SOLUTION INTRAVENOUS at 09:00

## 2020-11-12 RX ADMIN — FUROSEMIDE 40 MG: 10 INJECTION, SOLUTION INTRAMUSCULAR; INTRAVENOUS at 18:13

## 2020-11-12 RX ADMIN — MORPHINE SULFATE 1 MG: 2 INJECTION, SOLUTION INTRAMUSCULAR; INTRAVENOUS at 01:35

## 2020-11-12 RX ADMIN — APIXABAN 2.5 MG: 5 TABLET, FILM COATED ORAL at 19:46

## 2020-11-12 RX ADMIN — QUETIAPINE FUMARATE 200 MG: 100 TABLET ORAL at 19:46

## 2020-11-12 RX ADMIN — Medication 10 ML: at 09:18

## 2020-11-12 RX ADMIN — POTASSIUM CHLORIDE 10 MEQ: 7.46 INJECTION, SOLUTION INTRAVENOUS at 10:34

## 2020-11-12 RX ADMIN — FUROSEMIDE 40 MG: 10 INJECTION, SOLUTION INTRAMUSCULAR; INTRAVENOUS at 09:03

## 2020-11-12 RX ADMIN — APIXABAN 2.5 MG: 5 TABLET, FILM COATED ORAL at 09:03

## 2020-11-12 ASSESSMENT — PAIN DESCRIPTION - ORIENTATION: ORIENTATION: LEFT

## 2020-11-12 ASSESSMENT — PAIN SCALES - GENERAL
PAINLEVEL_OUTOF10: 0
PAINLEVEL_OUTOF10: 7

## 2020-11-12 ASSESSMENT — PAIN DESCRIPTION - FREQUENCY: FREQUENCY: INTERMITTENT

## 2020-11-12 ASSESSMENT — PAIN DESCRIPTION - ONSET: ONSET: ON-GOING

## 2020-11-12 ASSESSMENT — PAIN DESCRIPTION - DESCRIPTORS: DESCRIPTORS: ACHING

## 2020-11-12 ASSESSMENT — PAIN DESCRIPTION - LOCATION: LOCATION: CHEST

## 2020-11-12 ASSESSMENT — PAIN - FUNCTIONAL ASSESSMENT: PAIN_FUNCTIONAL_ASSESSMENT: ACTIVITIES ARE NOT PREVENTED

## 2020-11-12 ASSESSMENT — PAIN DESCRIPTION - PROGRESSION: CLINICAL_PROGRESSION: GRADUALLY WORSENING

## 2020-11-12 ASSESSMENT — PAIN DESCRIPTION - PAIN TYPE: TYPE: ACUTE PAIN

## 2020-11-12 NOTE — DISCHARGE INSTR - COC
{[facility] DME NRDL:183638969}  Med Admin  {[facility] DME EFVM:237454144}  Med Delivery   { [facility] MED Delivery:007603292}    Wound Care Documentation and Therapy:        Elimination:  Continence:   · Bowel: {YES / HQ:20094}  · Bladder: {YES / GY:31054}  Urinary Catheter: {Urinary Catheter:480634647}   Colostomy/Ileostomy/Ileal Conduit: {YES / TW:07172}       Date of Last BM: ***    Intake/Output Summary (Last 24 hours) at 2020 1115  Last data filed at 2020 0953  Gross per 24 hour   Intake 240 ml   Output 1400 ml   Net -1160 ml     I/O last 3 completed shifts:   In: 120 [P.O.:120]  Out: 1250 [Urine:1250]    Safety Concerns:     [address] Safety Concerns:066392739}    Impairments/Disabilities:      [address] Impairments/Disabilities:095929422}    Nutrition Therapy:  Current Nutrition Therapy:   [address] Diet List:554133957}    Routes of Feeding: {[facility] DME Other Feedings:121579845}  Liquids: {Slp liquid thickness:04113}  Daily Fluid Restriction: {[facility] DME Yes amt example:816940436}  Last Modified Barium Swallow with Video (Video Swallowing Test): {Done Not Done YRWS:097778060}    Treatments at the Time of Hospital Discharge:   Respiratory Treatments: ***  Oxygen Therapy:  {Therapy; copd oxygen:47179}  Ventilator:    { CC Vent RPIJ:919609055}    Rehab Therapies: {THERAPEUTIC INTERVENTION:5907153048}  Weight Bearing Status/Restrictions: [address] Weight Bearin}  Other Medical Equipment (for information only, NOT a DME order):  {EQUIPMENT:577088462}  Other Treatments: ***    Patient's personal belongings (please select all that are sent with patient):  {[facility] DME Belongings:001082552}    RN SIGNATURE:  {Esignature:870754681}    CASE MANAGEMENT/SOCIAL WORK SECTION    Inpatient Status Date: ***    Readmission Risk Assessment Score:  Readmission Risk              Risk of Unplanned Readmission:        19           Discharging to Facility/ Agency     [address] [place] [address] for [address] [facility] [address] [facility] [address]       Phone: [phone]       Fax: [phone]        Dialysis Facility (if applicable)   · Name:  · Address:  · Dialysis Schedule:  · Phone:  · Fax:    / signature: {Esignature:216688549}    PHYSICIAN SECTION    Prognosis: {Prognosis:2829025793}    Condition at Discharge: [address] [place] [address] Patient Condition:278086993}    Rehab Potential (if transferring to Rehab): {Prognosis:4544457780}    Recommended Labs or Other Treatments After Discharge: ***    Physician Certification: I certify the above information and transfer of Keith Santiago  is necessary for the continuing treatment of the diagnosis listed and that he requires {Admit to Appropriate Level of Care:75901} for {GREATER/LESS:488960853} 30 days.      Update Admission H&P: {CHP DME Changes in SHEOR:629504860}    PHYSICIAN SIGNATURE:  {Esignature:451566261}

## 2020-11-12 NOTE — PLAN OF CARE
Nutrition Problem #1: Predicted inadequate energy intake  Intervention: Food and/or Nutrient Delivery: Continue Current Diet, Start Oral Nutrition Supplement  Nutritional Goals: Patient will consume 50% or greater of all meals and supplements offered

## 2020-11-12 NOTE — PROGRESS NOTES
Patient arrived to the unit from the ED via stretcher. Patient alert to person upon arrival. Oxygen was 85%, flow increased to 6 L and it is now 95%. Fall prevention protocol in place at that this time. Patient is in bed locked to the lowest position with the alarm on. Side rails up x3. Bedside table, call light, and all personal belongings within reach. Sitter is watching. Will continue to monitor.

## 2020-11-12 NOTE — PROGRESS NOTES
Restraints d/c due to pt now alert and oriented. Some confusion noted but pt is easily re-oriented. Bed alarm remains on at this time.  Call light is within reach Electronically signed by Elsy Cohen RN on 11/12/2020 at 4:40 PM

## 2020-11-12 NOTE — PROGRESS NOTES
Progress Note        Date:11/12/2020       EUNR:[id number]/[id number]  Patient Brigida Schulte     YOB: 1940     Age:80 y.o. Chief Complaint   Patient presents with   Bebeto Panda Altered Mental Status     for the past few days per wife to EMS, but even more so when he takes his sleeping pills            Subjective   Interval History Status: not changed. Overnight patient was agitated and required restraints. Patient is sleepy this morning. Review of Systems   ROS as mentioned above. Medications   Scheduled Meds:    apixaban  2.5 mg Oral BID    atorvastatin  10 mg Oral Nightly    QUEtiapine  200 mg Oral Nightly    sodium chloride flush  10 mL Intravenous 2 times per day    metoprolol succinate  50 mg Oral BID    furosemide  40 mg Intravenous BID     Continuous Infusions:   PRN Meds: sodium chloride flush, acetaminophen    Past History    Past Medical History:   has a past medical history of Aortic dissection, thoracic (HCC), Atrial fibrillation ([address] Atrial fibrillation with rapid ventricular response ([address] Chronic diastolic congestive heart failure ([address] Critical illness myopathy, Hx of repair of dissecting thoracic aortic aneurysm, Palmer type A, Hypertension, Intermittent complete heart block ([address] Psychiatric problem, Renal artery stenosis ([address] Renal failure, S/P knee replacement, S/P thoracic aortic aneurysm repair, Secondary hyperparathyroidism, renal ([address] Thoracic aortic dissection ([address] and Urinary retention with incomplete bladder emptying. Social History:   reports that he has never smoked. He has never used smokeless tobacco. He reports current alcohol use of about 6.0 standard drinks of alcohol per week. He reports that he does not use drugs.      Family History:   Family History   Problem Relation Age of Onset    Alcohol Abuse Son        Physical Examination      Vitals:  BP (!) 143/98   Pulse 81   Temp 95.7 °F (35.4 °C) (Axillary)   Resp 16   Ht 6' 2\" (1.88 m)   SpO2 100%   BMI 22.91 kg/m²   Temp (24hrs), Av °F (36.1 °C), Min:95.7 °F (35.4 °C), Max:97.6 °F (36.4 °C)      I/O (24Hr): Intake/Output Summary (Last 24 hours) at 2020 1239  Last data filed at 2020 1114  Gross per 24 hour   Intake 240 ml   Output 1400 ml   Net -1160 ml       Physical Exam  Constitutional:       Comments: Sleepy     HENT:      Head: Normocephalic and atraumatic. Neck:      Comments: JVD +  Cardiovascular:      Rate and Rhythm: Normal rate. Rhythm irregular. Pulses: Normal pulses. Pulmonary:      Effort: Pulmonary effort is normal.      Breath sounds: Normal breath sounds. Abdominal:      General: Abdomen is flat. Palpations: Abdomen is soft. Musculoskeletal: Normal range of motion. Skin:     General: Skin is warm. Capillary Refill: Capillary refill takes less than 2 seconds. Labs/Imaging/Diagnostics   Labs:  CBC:   Recent Labs     20   WBC 5.5   HGB 10.7*   HCT 32.8*   MCV 98.9   PLT 78*     BMP:   Recent Labs     20  0436   * 139   K 3.6 3.3*   CL 98* 97*   CO2 24 28   PHOS 3.4 3.7   BUN 43* 39*   CREATININE 3.6* 3.1*     Mag:   Lab Results   Component Value Date    MG 2.30 2020     LIVER PROFILE: No results for input(s): AST, ALT, LIPASE, BILIDIR, BILITOT, ALKPHOS in the last 72 hours. Invalid input(s): AMYLASE,  ALB  PT/INR: No results for input(s): PROTIME, INR in the last 72 hours. APTT: No results for input(s): APTT in the last 72 hours. BNP:  No results for input(s): BNP in the last 72 hours. CARDIAC ENZYMES:   Recent Labs     20  0613   TROPONINI 0.05* 0.04*         Imaging Last 24 Hours:  US RENAL COMPLETE   Final Result      1. Increased echogenicity throughout the renal cortex and some renal atrophy without stone disease or hydronephrosis. 2. Simple cysts identified in left kidney measuring up to 1.9 x 1.7 cm   3.  Urinary bladder not well

## 2020-11-12 NOTE — PROGRESS NOTES
NUTRITION ASSESSMENT  Admission Date: 11/11/2020     Type and Reason for Visit: Positive Nutrition Screen    NUTRITION RECOMMENDATIONS:   1. PO Diet: Continue low sodium diet  2. ONS: Add Ensure Enlive BID    NUTRITION ASSESSMENT:  Positive screen for wt loss and por po. Patient admitted after a fall with AMS. No wt los in EMR, unable to assess wt changes. Per EMR pt agitated overnight requiring restraints. PO documented as % of 3 meals since admission. Will order ONS and monitor nutritional status. MALNUTRITION ASSESSMENT  Context of Malnutrition: Acute Illness   Malnutrition Status:  At risk for malnutrition (Comment)  Findings of the 6 clinical characteristics of malnutrition (Minimum of 2 out of 6 clinical characteristics is required to make the diagnosis of moderate or severe Protein Calorie Malnutrition based on AND/ASPEN Guidelines):    NUTRITION DIAGNOSIS   Problem: Problem #1: Predicted inadequate energy intake  Etiology: Acute or chronic injury or trauma  Signs & Symptoms: Intake 0-25%    NUTRITION INTERVENTION  Food and/or Nutrient Delivery:Continue Current diet  or Start ONS   Nutrition education/counseling/coordination of care: Continue Inpatient Monitoring     NUTRITION MONITORING & EVALUATION:  Evaluation:Goals set   Goals:Goals: Patient will consume 50% or greater of all meals and supplements offered  Monitoring: Diet Tolerance , Meal Intake  or Supplement Intake      OBJECTIVE DATA:  · Nutrition-Focused Physical Findings: No BM recorded  · Wounds None      Past Medical History:   Diagnosis Date    Aortic dissection, thoracic ([address] 3/20/2017    Overview:  [facility] Atrial fibrillation [facility])     Atrial fibrillation with rapid ventricular response ([address] 3/11/2017    Chronic diastolic congestive heart failure ([address] 3/29/2017    Critical illness myopathy 9/15/2016    Hx of repair of dissecting thoracic aortic

## 2020-11-12 NOTE — PROGRESS NOTES
Speech Language Pathology  Facility/Department: [address] [place] [address],[address] EVALUATION- Discharge    NAME: Cindy Hampton  : 1940  MRN: [id number]    ADMISSION DATE: 2020  ADMITTING DIAGNOSIS: has Rotator cuff (capsule) sprain; Acute respiratory failure with hypoxia ([address] AAA (abdominal aortic aneurysm) without rupture ([address] Acquired elevated diaphragm; Anemia in chronic renal disease; Anemia of chronic renal failure, stage 4 (severe) ([address] Bilateral leg edema; Bipolar affective disorder ([address] Cardiac pacemaker in situ; Chronic atrial fibrillation ([address] Chronic diastolic congestive heart failure (HCC); CKD (chronic kidney disease) stage 4, GFR 15-29 ml/min ([address] Critical illness myopathy; Dissection of thoracic aorta ([address] Dyspnea on exertion; Essential hypertension; Hyperlipidemia; Intermittent complete heart block ([address] Iron deficiency anemia; Nonrheumatic aortic valve stenosis; Non-rheumatic mitral regurgitation; Peripheral vascular disease ([address] Renal artery stenosis (HCC); S/P thoracic aortic aneurysm repair; Secondary hyperparathyroidism, renal ([address] Squamous cell carcinoma of skin; Ascending Thoracic Aortic Aneurysm s/p Dissection s/p Repair; Type 1 dissection of ascending aorta ([address] Urinary retention with incomplete bladder emptying; Vitamin D deficiency; Acute decompensated heart failure ([address] Thrombocytopenia ([address]  Anticoagulated; and CHF (congestive heart failure), NYHA class I, acute on chronic, combined ([address] on their problem list.  ONSET DATE: 20    Recent Chest Xray/CT of Chest: (20)  Impression      No acute findings in the chest.        Date of Eval: 2020  Evaluating Therapist: Davin Jackson    Current Diet level:  Current Diet : Regular  Current Liquid Diet : Thin      Primary Complaint  Patient Complaint: None reported    Pain:  Pain Assessment  Pain Assessment: 0-10  Pain Level: 7  Patient's Stated Pain Goal: No pain  Pain Type: Acute pain  Pain Location: Chest  Pain Orientation: Left  Pain Descriptors: Aching  Pain Frequency: Intermittent  Pain Onset: On-going  Clinical Progression: Gradually worsening  Functional Pain Assessment: Activities are not prevented  Non-Pharmaceutical Pain Intervention(s): Rest    Reason for Referral  Ranulfo Coates was referred for a bedside swallow evaluation to assess the efficiency of his swallow function, identify signs and symptoms of aspiration and make recommendations regarding safe dietary consistencies, effective compensatory strategies, and safe eating environment. Impression  Dysphagia Diagnosis: Swallow function appears grossly intact  Dysphagia Impression : Oropharyngeal swallow appears grossly WFLs at this time; recommend ongoing monitoring of respiratory symptoms given acute hypoxia upon hospitalization however, tolerating regular + thin diet at this time. No overt s/s of aspiration; completed 3oz water test w/o difficulties. Recommend continue current diet and consult ST should s/s of aspiration emerge. Dysphagia Outcome Severity Scale: Level 6: Within functional limits/Modified independence     Treatment Plan  Requires SLP Intervention: No  Duration/Frequency of Treatment: No further dysphagia tx warranted at this time  D/C Recommendations: 24 hour supervision/assistance       Recommended Diet and Intervention  Diet Solids Recommendation: Regular  Liquid Consistency Recommendation: Thin  Recommended Form of Meds: PO        Compensatory Swallowing Strategies  Compensatory Swallowing Strategies: Upright as possible for all oral intake;Small bites/sips; External pacing    General  Chart Reviewed: Yes  Comments: 79 y.o. male who presents to the emergency department for a fall. Patient has a history of atrial fibrillation and reportedly fell at home.   The patient is an unclear historian and wife told EMS that the patient has had increasing confusion over the last several days, but also notes that he does have some confusion with taking his night medications. The patient otherwise has no complaints at this time. On arrival, the patient was noted to be hypoxic with oxygen saturations in the high 80s on room air. Subjective  Subjective: RN reported pt agitated and agressive last night however, AAO and pleasant today. Consuming lunch upon arrival; wife present at bedside. Participatory t/o. AAOx3. Behavior/Cognition: Alert; Cooperative  Temperature Spikes Noted: No  Respiratory Status: Room air  O2 Device: None (Room air)  Communication Observation: Functional  Follows Directions: Simple  Dentition: Adequate  Patient Positioning: Upright in bed  Baseline Vocal Quality: Hoarse  Volitional Cough: Strong  Prior Dysphagia History: None per chart review  Consistencies Administered: Reg solid; Dysphagia Soft and Bite-Sized (Dysphagia III); Thin - cup; Thin - straw      Vision/Hearing  Vision  Vision: Within Functional Limits  Hearing  Hearing: Within functional limits    Oral Motor Deficits  Oral/Motor  Oral Motor: Within functional limits    Oral Phase Dysfunction  Oral Phase  Oral Phase: WFL  Oral Phase  Oral Phase - Comment: Oral phase grossly WFLs; pt noted to have mild impulsivity during meal w/ large, reoccuring bites but adequate mastication and complete clearance of oral cavity. No oral or lingual residue. Adequate labial seal and intraoral pressure for use of spoon/cup/straw. Anterior spillage noted x2 after initial bolus placement d/t large bites and quick pacing. Indicators of Pharyngeal Phase Dysfunction   Pharyngeal Phase  Pharyngeal Phase: WFL  Pharyngeal Phase   Pharyngeal: Pharyngeal phase appears grossly WFLs; palpable laryngeal elevation and no overt s/s of aspiration. x1 instance of wet vocal quality after swallow of regular solid however, pt independently used throat clear and voice returned to baseline.  Completed sequential swallow and 3oz water test w/o difficulties;

## 2020-11-12 NOTE — CONSULTS
Clinical Pharmacy Progress Note  Medication History     Admit Date: 11/11/2020    Asked to verify home medications by Dr. Keyonna Garcia. List of of current medications patient is taking is complete. Home Medication list in EPIC updated to reflect changes noted below. Home med list was updated by ED pharmacist yesterday - please see her note for full details. Pt still too sleepy / confused to provide further information today - so med list is based on prescriptions filled at [facility]. Pt's son requested that Dr. Keyonna Garcia check a lithium level today. · Per [employer] and [employer], patient has not filled Lithium in at least 2 years. · Lithium was listed on patient's allergy list in Epic in March 2017, with reaction of \"affects his kidneys\". · Review of old records at [facility] and in [facility] confirm that Lithium has not been on his home med list in several years. Last mention of Lithium as a home med was in an ED note from 4/2016. Assume Lithium was discontinued (likely by nephrology given CKD 4) sometime between 4/2016 & 3/2017.     Please call with questions--  Thanks--  Lorin Skiff, PharmD, [address] [facility]  [phone] or [id number] ([facility])   11/12/2020 1:17 PM

## 2020-11-12 NOTE — PROGRESS NOTES
I's and O's and weights inaccurate. BP elevated. Oxygen requirements decreased from 6 L to 3 L via nasal cannula on Lasix 40 mg IV twice daily. Creatinine improving at 3.1 from 3.6. Echocardiogram is pending.    -Continue with current therapy. I would like to thank you for providing me the opportunity to participate in the care of your patient. If you have any questions, please do not hesitate to contact me.      Tiffanie Smith MD, [facility], [address]  [facility] [address]  [address] [place] [address]  Ph: [phone]  Fax: [phone]

## 2020-11-12 NOTE — CARE COORDINATION
Case Management Assessment           Daily Note                 Date/ Time of Note: 11/12/2020 11:05 AM         Note completed by: Trell Ugalde    Patient Name: Cindy Hampton  YOB: 1940    Diagnosis:CHF (congestive heart failure), NYHA class I, acute on chronic, combined ([facility] [I50.43]  CHF (congestive heart failure), NYHA class I, acute on chronic, combined ([facility] [I50.43]  Patient Admission Status: Inpatient    Date of Admission:11/11/2020  1:44 AM Length of Stay: 1 GLOS:      Current Plan of Care: admitted  For  Hypoxia:  Cardiology consulted  :    Rec:  Echo after  Covid  R/O :  IV lasix:    In soft  Wrist restraints  ; Physically aggressive   ________________________________________________________________________________________  PT AM-PAC:   / 24 per last evaluation on: not ordered     OT AM-PAC:   / 24 per last evaluation on: not ordered     DME Needs for discharge: TBD   ________________________________________________________________________________________  Discharge Plan: Home with [address]: if  rec  benefit from Out tp HF  program     Tentative discharge date: 1-2 days    Current barriers to discharge: Cardiology clearance      Referrals completed: [address]: [facility] following     Resources/ information provided: [address] List  ________________________________________________________________________________________  Case Management Notes:    Patient usually lives at home with wife. Patient is an 79year old male admitted for CHF, AMS, s/p fall. Patient reports he is usually independent in ADLs. Patient reports he has home oxygen through [facility]. Patient may benefit from home care on d/c. [facility] following   spoke with RN Espinoza Pierre who confirmed  Pt still in soft wrist restraints  D/t  AMS and  physical agressive  behavioral with staff,  [id number] [facility] / hitting .  Cont to follow     Has  Home O 2  :    [address] [facility] [address] , [address] [facility] [place] [address]       Phone: [phone]       Fax: [phone]        Adela Fuchs and his family were provided with choice of provider; he and his family are in agreement with the discharge plan.     Care Transition Patient: Portia Huang RN  [facility]  Case Management Department  Ph: [phone]

## 2020-11-12 NOTE — PLAN OF CARE
Problem: Falls - Risk of:  Goal: Will remain free from falls  Description: Will remain free from falls  11/12/2020 1543 by Estela Armendariz RN  Outcome: Met This Shift     Problem: Restraint Use - Nonviolent/Non-Self-Destructive Behavior:  Goal: Absence of restraint indications  Description: Absence of restraint indications  11/12/2020 1543 by Estela Armendariz RN  Outcome: Ongoing     Problem: Nutrition  Goal: Optimal nutrition therapy  Outcome: Ongoing

## 2020-11-12 NOTE — PROGRESS NOTES
Pt son called requesting lithium level check. Message sent to Dr Brown Allison.  Electronically signed by Prashant Kim RN on 11/12/2020 at 12:36 PM

## 2020-11-12 NOTE — PLAN OF CARE
Problem: Falls - Risk of:  Goal: Will remain free from falls  Description: Will remain free from falls  Outcome: Ongoing  Note: Patient is a fall risk. Patient is restrained do to trying to get up and not being able to reorient. See Fall Risk assessment for details. Bed is in low, lock position; call light/belongings within reach. No attempts to get out of bed have been made, calls appropriately when assistance is needed. Bed alarm and hourly rounds in place for safety. Will continue to monitor and reassess throughout shift. Goal: Absence of physical injury  Description: Absence of physical injury  Outcome: Ongoing     Problem: Restraint Use - Nonviolent/Non-Self-Destructive Behavior:  Goal: Absence of restraint indications  Description: Absence of restraint indications  Outcome: Ongoing  Note: Patient was put in restraints at 2330 on  because was trying to get out of bed, unable to reorient, and becoming combative, restraints  at 2330 today.    Goal: Absence of restraint-related injury  Description: Absence of restraint-related injury  Outcome: Ongoing

## 2020-11-12 NOTE — PROGRESS NOTES
Patient was put in restraints at 2330 on  because was trying to get out of bed, unable to reorient, and becoming combative, restraints  at 2330 today. Called wife, left a message, called back told her that I had to put him in restraints. Will continue to monitor.

## 2020-11-12 NOTE — PROGRESS NOTES
[facility] NEPHROLOGY    [facility]              [phone]                      Ash Mcleod is admitted with CHF. We are following for CKD with elevated Cr. Interval History and plan:      Patient was seen and examined at bedside this AM. He is a patient of Dr. Sharath Adkins. Patient has left AMA in previous admission. He denies any chest pain, SOB, fever, chills, nausea, vomiting. Patient is altered, so the information he provided is unreliable  Urine output is 1.2 L. Blood pressure is elevated. Creatinine is 3.1 and is close to baseline. Replace potassium for hypokalemia. Continue Lasix 40 mg twice daily. Continue increase Toprol-XL. Assessment :     CKD IV -V  Cause of CKD from HTN, Lithium use, R renal artery stenosis, NSAID use and Cardiorenal syndrome. Cr today - 3.6. Baseline last year 2.7-2.8. Currently doesn't seem to be volume overloaded but Pro-BNP is 21,891. No crackles or edema on examination. He received 80 mg lasix IV in the ED. US from 2019 shows mass in R kidney, L kidney with cyst. His current creatinine seems to be his new baseline  from progression of his CKD. · Ultrasound of the kidney showed increased echogenicity and simple cyst in the left kidney. · PTH is stable at 93. · No need for phosphorus binders. · Avoid nephrotoxic agents   · He has minimal proteinuria. Hypertension   Patient is hypertensive. ·  Increase dose of metoprolol     Atrial fibrillation   · At home taking Metoprolol 25 mg BID. Will increase the dose for hypertension.    · Apixaban 2.5 mg BID     [facility] Nephrology would like to thank Bryanna Bryant MD   for opportunity to serve this patient      Please call with questions at-   24 Hrs Answering service [phone] or  7 am- 5 pm via Perfect serve or cell phone  Arpita Jauregui         CC/reason for consult :     CKD        HPI :     Montrell Garcia is a 79 y.o. male presented to   the hospital on 11/11/2020 with fall and AMS. Patient has a past medical history of a-fib, CHF, COPD, AAA. The patient is an unclear historian and wife told EMS that the patient has had increasing confusion over the last several days, but also notes that he does have some confusion with taking his night medications. The patient otherwise has no complaints at this time. On arrival, the patient was noted to be hypoxic with oxygen saturations in the high 80s on room air. ROS:     positives in bold   Constitutional:  fever, chills, weakness, weight change, fatigue  Skin:  rash, pruritus, hair loss, bruising, dry skin, petechiae  Head, Face, Neck   headaches, swelling,  cervical adenopathy  Respiratory: shortness of breath, cough, or wheezing  Cardiovascular: chest pain, palpitations, dizzy, edema  Gastrointestinal: nausea, vomiting, diarrhea, constipation,belly pain    Yellow skin, blood in stool  Musculoskeletal:  back pain, muscle weakness, gait problems,       joint pain or swelling. Genitourinary:  dysuria, poor urine flow, flank pain, blood in urine  Neurologic:  vertigo, TIA'S, syncope, seizures, focal weakness  Psychosocial:  insomnia, anxiety, or depression.                       All other remaining systems are negative or unable to obtain        PMH/PSH/SH/Family History:     Past Medical History:   Diagnosis Date    AAA (abdominal aortic aneurysm) ([address]     AAA (abdominal aortic aneurysm) without rupture ([address] 11/29/2017    Aortic dissection, thoracic ([address] 3/20/2017    Overview:  Emergency type I dissection repair 8/1/16 at [facility] in [address] [place] [address] Aortic dissection, thoracic [facility]) 3/20/2017    Overview:  Emergency type I dissection repair 8/1/16 at [facility] in [address] [place] [address] Atrial fibrillation [facility])     Atrial fibrillation with rapid ventricular response ([address] 3/11/2017    Chronic diastolic congestive heart failure ([address] 3/29/2017    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 Sergio Ferguson at [facility] heart and vascular [place], [place] on August 1, 2016.  Hypertension     Intermittent complete heart block ([address] 8/25/2018    Psychiatric problem     Renal artery stenosis ([address] 10/10/2016    Renal failure     S/P knee replacement 12/12/2014    S/P thoracic aortic aneurysm repair 11/12/2016    Secondary hyperparathyroidism, renal ([address] 7/19/2018    Thoracic aortic dissection ([address] 10/10/2016    Overview:  Type A thoracic aortic dissection and aneurysm repair by Dr. Sergio Ferguson at [facility] heart and vascular [place], [place] on August 1, 2016.  Urinary retention with incomplete bladder emptying 3/19/2017    Volume overload 10/12/2016       Past Surgical History:   Procedure Laterality Date    ABDOMINAL AORTIC ANEURYSM REPAIR      disected     HAND SURGERY      left hand        reports that he has never smoked. He has never used smokeless tobacco. He reports current alcohol use of about 6.0 standard drinks of alcohol per week. He reports that he does not use drugs. family history includes Alcohol Abuse in his son.          Medication:     Current Facility-Administered Medications: apixaban (ELIQUIS) tablet 2.5 mg, 2.5 mg, Oral, BID  atorvastatin (LIPITOR) tablet 10 mg, 10 mg, Oral, Nightly  QUEtiapine (SEROQUEL) tablet 200 mg, 200 mg, Oral, Nightly  sodium chloride flush 0.9 % injection 10 mL, 10 mL, Intravenous, 2 times per day  sodium chloride flush 0.9 % injection 10 mL, 10 mL, Intravenous, PRN  acetaminophen (TYLENOL) tablet 650 mg, 650 mg, Oral, Q4H PRN  metoprolol succinate (TOPROL XL) extended release tablet 50 mg, 50 mg, Oral, BID  furosemide (LASIX) injection 40 mg, 40 mg, Intravenous, BID  perflutren lipid microspheres (DEFINITY) injection 1.65 mg, 1.5 mL, Intravenous, ONCE PRN       Vitals :     Vitals:    11/12/20 0437   BP: (!) 165/105   Pulse: 88   Resp: 20   Temp: 97.3 °F (36.3 °C)   SpO2: 100%       I & O :       Intake/Output Summary (Last 24 hours) at 11/12/2020 0758  Last data filed at 11/11/2020 2244  Gross per 24 hour   Intake 120 ml   Output 1250 ml   Net -1130 ml        Physical Examination :     General appearance: Anxious- no, distressed- no, in good spirits- yes    HEENT: Lips- normal, teeth- ok , oral mucosa- moist  Neck : Mass- no, appears symmetrical, JVD- not visible  Respiratory: Respiratory effort- normal  Cardiovascular: Ausculation- systolic murmur   Abdomen: visible mass- no, distention- no, scar- no, tenderness- no                            hepatosplenomegaly-  no  Musculoskeletal:  clubbing no,cyanosis- no , digital ischemia- no                           muscle strength- grossly normal , tone - grossly normal  Skin: rashes- no , ulcers- no, induration- no, tightening - no  Psychiatric:  AAOX0       LABS:     Recent Labs     11/11/20  0209   WBC 5.5   HGB 10.7*   HCT 32.8*   PLT 78*     Recent Labs     11/11/20  0209 11/12/20  0436   * 139   K 3.6 3.3*   CL 98* 97*   CO2 24 28   BUN 43* 39*   CREATININE 3.6* 3.1*   GLUCOSE 113* 88   MG 2.30  --    PHOS 3.4 3.7      Patient was seen and examined and the case was discussed with the resident. He acted as my scribe. I agree with the assessment and plan.     Thanks  Nephrology  [address] [facility]  Katie Sullivan, [address]  Office: [phone]  Cell: [phone]  Fax: [phone]

## 2020-11-13 ENCOUNTER — APPOINTMENT (OUTPATIENT)
Dept: GENERAL RADIOLOGY | Age: 80
DRG: 291 | End: 2020-11-13
Payer: MEDICARE

## 2020-11-13 LAB
ALBUMIN SERPL-MCNC: 4.4 G/DL (ref 3.4–5)
ANION GAP SERPL CALCULATED.3IONS-SCNC: 17 MMOL/L (ref 3–16)
BUN BLDV-MCNC: 40 MG/DL (ref 7–20)
CALCIUM SERPL-MCNC: 8.7 MG/DL (ref 8.3–10.6)
CHLORIDE BLD-SCNC: 93 MMOL/L (ref 99–110)
CO2: 28 MMOL/L (ref 21–32)
CREAT SERPL-MCNC: 3.2 MG/DL (ref 0.8–1.3)
GFR AFRICAN AMERICAN: 23
GFR NON-AFRICAN AMERICAN: 19
GLUCOSE BLD-MCNC: 113 MG/DL (ref 70–99)
PHOSPHORUS: 3.4 MG/DL (ref 2.5–4.9)
POTASSIUM SERPL-SCNC: 4 MMOL/L (ref 3.5–5.1)
SODIUM BLD-SCNC: 138 MMOL/L (ref 136–145)

## 2020-11-13 PROCEDURE — 2500000003 HC RX 250 WO HCPCS: Performed by: INTERNAL MEDICINE

## 2020-11-13 PROCEDURE — 6370000000 HC RX 637 (ALT 250 FOR IP): Performed by: INTERNAL MEDICINE

## 2020-11-13 PROCEDURE — 2060000000 HC ICU INTERMEDIATE R&B

## 2020-11-13 PROCEDURE — 2700000000 HC OXYGEN THERAPY PER DAY

## 2020-11-13 PROCEDURE — 6360000002 HC RX W HCPCS: Performed by: INTERNAL MEDICINE

## 2020-11-13 PROCEDURE — 80069 RENAL FUNCTION PANEL: CPT

## 2020-11-13 PROCEDURE — 2580000003 HC RX 258: Performed by: INTERNAL MEDICINE

## 2020-11-13 PROCEDURE — 99233 SBSQ HOSP IP/OBS HIGH 50: CPT | Performed by: INTERNAL MEDICINE

## 2020-11-13 PROCEDURE — 94761 N-INVAS EAR/PLS OXIMETRY MLT: CPT

## 2020-11-13 PROCEDURE — 36415 COLL VENOUS BLD VENIPUNCTURE: CPT

## 2020-11-13 PROCEDURE — 73130 X-RAY EXAM OF HAND: CPT

## 2020-11-13 RX ORDER — TRANYLCYPROMINE 10 MG/1
10 TABLET ORAL 2 TIMES DAILY
Status: DISCONTINUED | OUTPATIENT
Start: 2020-11-13 | End: 2020-11-13

## 2020-11-13 RX ORDER — FUROSEMIDE 40 MG/1
40 TABLET ORAL 2 TIMES DAILY
Status: DISCONTINUED | OUTPATIENT
Start: 2020-11-13 | End: 2020-11-15

## 2020-11-13 RX ORDER — TRANYLCYPROMINE 10 MG/1
20 TABLET ORAL 2 TIMES DAILY
Status: DISCONTINUED | OUTPATIENT
Start: 2020-11-13 | End: 2020-11-15 | Stop reason: HOSPADM

## 2020-11-13 RX ADMIN — FUROSEMIDE 40 MG: 10 INJECTION, SOLUTION INTRAMUSCULAR; INTRAVENOUS at 08:24

## 2020-11-13 RX ADMIN — Medication 10 ML: at 08:24

## 2020-11-13 RX ADMIN — TRANYLCYPROMINE 20 MG: 10 TABLET ORAL at 19:52

## 2020-11-13 RX ADMIN — OLANZAPINE 2.5 MG: 10 INJECTION, POWDER, LYOPHILIZED, FOR SOLUTION INTRAMUSCULAR at 16:30

## 2020-11-13 RX ADMIN — APIXABAN 2.5 MG: 5 TABLET, FILM COATED ORAL at 19:51

## 2020-11-13 RX ADMIN — ATORVASTATIN CALCIUM 10 MG: 20 TABLET, FILM COATED ORAL at 19:51

## 2020-11-13 RX ADMIN — ACETAMINOPHEN 650 MG: 325 TABLET ORAL at 02:20

## 2020-11-13 RX ADMIN — ACETAMINOPHEN 650 MG: 325 TABLET ORAL at 08:23

## 2020-11-13 RX ADMIN — FUROSEMIDE 40 MG: 40 TABLET ORAL at 16:39

## 2020-11-13 RX ADMIN — Medication 10 ML: at 19:52

## 2020-11-13 RX ADMIN — APIXABAN 2.5 MG: 5 TABLET, FILM COATED ORAL at 08:23

## 2020-11-13 RX ADMIN — METOPROLOL SUCCINATE 50 MG: 50 TABLET, EXTENDED RELEASE ORAL at 08:23

## 2020-11-13 RX ADMIN — QUETIAPINE FUMARATE 200 MG: 100 TABLET ORAL at 19:52

## 2020-11-13 RX ADMIN — METOPROLOL SUCCINATE 50 MG: 50 TABLET, EXTENDED RELEASE ORAL at 19:51

## 2020-11-13 ASSESSMENT — PAIN - FUNCTIONAL ASSESSMENT
PAIN_FUNCTIONAL_ASSESSMENT: ACTIVITIES ARE NOT PREVENTED
PAIN_FUNCTIONAL_ASSESSMENT: ACTIVITIES ARE NOT PREVENTED

## 2020-11-13 ASSESSMENT — PAIN DESCRIPTION - FREQUENCY
FREQUENCY: CONTINUOUS
FREQUENCY: INTERMITTENT

## 2020-11-13 ASSESSMENT — PAIN DESCRIPTION - DESCRIPTORS
DESCRIPTORS: ACHING
DESCRIPTORS: ACHING

## 2020-11-13 ASSESSMENT — PAIN DESCRIPTION - LOCATION
LOCATION: WRIST
LOCATION: HAND;WRIST

## 2020-11-13 ASSESSMENT — PAIN DESCRIPTION - PAIN TYPE
TYPE: ACUTE PAIN
TYPE: ACUTE PAIN

## 2020-11-13 ASSESSMENT — PAIN DESCRIPTION - ORIENTATION
ORIENTATION: LEFT
ORIENTATION: LEFT

## 2020-11-13 ASSESSMENT — PAIN SCALES - GENERAL
PAINLEVEL_OUTOF10: 7
PAINLEVEL_OUTOF10: 3
PAINLEVEL_OUTOF10: 6
PAINLEVEL_OUTOF10: 7
PAINLEVEL_OUTOF10: 0

## 2020-11-13 ASSESSMENT — PAIN DESCRIPTION - PROGRESSION
CLINICAL_PROGRESSION: NOT CHANGED
CLINICAL_PROGRESSION: NOT CHANGED

## 2020-11-13 ASSESSMENT — PAIN DESCRIPTION - ONSET
ONSET: ON-GOING
ONSET: ON-GOING

## 2020-11-13 NOTE — PLAN OF CARE
Problem: Falls - Risk of:  Goal: Will remain free from falls  Description: Will remain free from falls  11/13/2020 0109 by Harrison Rahman RN  Outcome: Ongoing  Note: Patient is a fall risk. Patient needs to be seen by PT/OT. See Fall Risk assessment for details. Bed is in low, lock position; call light/belongings within reach. No attempts to get out of bed have been made, calls appropriately when assistance is needed. Bed alarm and hourly rounds in place for safety. Will continue to monitor and reassess throughout shift.         Problem: Falls - Risk of:  Goal: Absence of physical injury  Description: Absence of physical injury  Outcome: Ongoing     Problem: Nutrition  Goal: Optimal nutrition therapy  11/13/2020 0109 by Harrison Rahman RN  Outcome: Ongoing

## 2020-11-13 NOTE — PROGRESS NOTES
Cardiology Consult Service  Daily Progress Note        Admit Date:  11/11/2020  Primary cardiologist: Dr Mercedes New at [place] [address]    Reason for Consultation/Chief Complaint: hypoxia    Subjective:      Keith Santiago is a 79 y.o. male with a past medical history of repaired TAA in 2016, CAF, moderate AS and MR.      Patient presented to the emergency room on 11/11 with increasing confusion over the last few days. He was found to be hypoxic on arrival with oxygen saturation in the 80s, adequate saturation on 3 L of supplemental oxygen. Otherwise patient was afebrile and vital signs stable. Creatinine 3.6 (was 2.7 in 03/2019), proBNP 21,000, troponin 0.04, hemoglobin 10.7, platelets 78, normal WBC, CT head and cervical spine unremarkable, chest x-ray within normal limits, ECG consistent with A. fib, 74 bpm, nonspecific changes.     Echo 2018: Normal LV size and LVEF 55-60%, indeterminate diastolic, moderate aortic stenosis, moderate mitral regurgitation, left atrial enlargement, moderate RV dilatation with normal function, RVSP 65 (RAP 15).    Patient was admitted for possible acute on chronic HFpEF, MATHEW on CKD. Cardiology was consulted for possible acute heart failure. Echo 11/12/2020: Mild LVH, normal LVEF 39-29%, diastolic dysfunction grade 3, moderate to severe TR, normal RV, IVC dilated, RVSP 63 (assuming RAP 15). Interval history: There were no overnight events. His IV diuretics were changed to p.o. by nephrology today. Creatinine improving at 3.2 from 3.6.     Objective:     Medications:   furosemide  40 mg Oral BID    apixaban  2.5 mg Oral BID    atorvastatin  10 mg Oral Nightly    QUEtiapine  200 mg Oral Nightly    sodium chloride flush  10 mL Intravenous 2 times per day    metoprolol succinate  50 mg Oral BID       IV drips:      PRN:  OLANZapine, sodium chloride flush, acetaminophen    Vitals:    11/13/20 0303 11/13/20 0743 11/13/20 0835 11/13/20 1132   BP: 118/79 130/82  (!) 128/95   Pulse: 77 79  82   Resp: 16 20 19 18   Temp: 99.1 °F (37.3 °C) 96.7 °F (35.9 °C)  97.9 °F (36.6 °C)   TempSrc: Oral Oral  Oral   SpO2: 95% 100% 100% 92%   Height:           Intake/Output Summary (Last 24 hours) at 11/13/2020 1320  Last data filed at 11/13/2020 1030  Gross per 24 hour   Intake 462 ml   Output 400 ml   Net 62 ml     I/O last 3 completed shifts: In: 582 [P. O.:582]  Out: 750 [Urine:750]  Wt Readings from Last 3 Encounters:   03/03/19 178 lb 6.4 oz (80.9 kg)   04/23/16 210 lb (95.3 kg)   03/17/14 208 lb (94.3 kg)       Admit Wt: Todays Wt:      TELEMETRY: Sinus     Physical Exam:         General Appearance:  Alert, cooperative, no distress, appears stated age Appropriate weight   Head:  Normocephalic, without obvious abnormality, atraumatic   Eyes:  PERRL, conjunctiva/corneas clear EOM intact  Ears normal   Throat no lesions       Nose: Nares normal, no drainage or sinus tenderness   Throat: Lips, mucosa, and tongue normal   Neck: Supple, symmetrical, trachea midline, no adenopathy, thyroid: not enlarged, symmetric, no tenderness/mass/nodules, no carotid bruit. Lungs:   Normal respiratory rate, lungs clear to auscultation without any wheezes, rubs or ronchi bilaterally. Chest Wall:  No tenderness or deformity   Heart:  Irregular rhythm, rate is controlled, S1, S2 normal, there is no murmur, there is no rub or gallop, no jvd, no bilateral lower extremity edema   Abdomen:   Soft, non-tender, bowel sounds active all four quadrants,  no masses, no organomegaly       Extremities: Extremities normal, atraumatic, no cyanosis.     Pulses: 2+ and symmetric   Skin: Skin color, texture, turgor normal, no rashes or lesions   Pysch: Normal mood and affect   Neurologic: Normal gross motor and sensory exam.  Cranial nerves intact       Labs:   Recent Labs     11/11/20  0209 11/12/20  0436 11/13/20  0929   * 139 138   K 3.6 3.3* 4.0   BUN 43* 39* 40*   CREATININE 3.6* 3.1* 3.2*   CL 98* 97* 93*   CO2 24 28 28   GLUCOSE 113* 88 113*   CALCIUM 9.0 8.9 8.7   MG 2.30  --   --      Recent Labs     11/11/20 0209   WBC 5.5   HGB 10.7*   HCT 32.8*   PLT 78*   MCV 98.9     No results for input(s): CHOLTOT, TRIG, HDL in the last 72 hours. Invalid input(s): LIPIDCOMM, CHOLHDL, VLDCHOL, LDL  No results for input(s): PTT, INR in the last 72 hours. Invalid input(s): PT  Recent Labs     11/11/20 0209 11/11/20  0613   TROPONINI 0.05* 0.04*     No results for input(s): BNP in the last 72 hours. No results for input(s): NTPROBNP in the last 72 hours. No results for input(s): TSH in the last 72 hours. Imaging:       Assessment & Plan:     1. Acute hypoxic respiratory failure:  Likely due to acute on chronic HFpEF. Patient's oxygen requirements have significantly improved on IV Lasix and he is now off supplemental oxygen.    -Agree with changing IV Lasix to p.o. 40 mg twice daily.  - Strict I's and O's every shift and standing weights if possible, low-salt diet and daily BMP.      2. Chronic A. Fib:  Patient remains in atrial fibrillation with rate control.     -Continue with Toprol 50 mg twice daily and Eliquis 2.5 twice daily     3. Aortic stenosis: It is moderate per echo in 2018. No evidence of aortic stenosis with echo this admission.    -Conservative approach.     4.  Mitral regurgitation: It is moderate per echo in 2018. He was mild per echo this admission.     -Conservative approach    Patient may be discharged home today on the above meds and follow up with me in 1 week with a BMP prior to visit. Please call me with any questions. I have spent 30 minutes of face to face time with the patient with more than 50% spent counseling and coordinating care. I have personally reviewed the reports and images of labs, radiological studies, cardiac studies including ECG's and telemetry, current and old medical records. The note was completed using EMR and Dragon dictation system.  Every effort was made to ensure accuracy; however, inadvertent computerized transcription errors may be present. All questions and concerns were addressed to the patient/family. Alternatives to my treatment were discussed. Thank you for allowing to us to participate in the care or Alana Benavides. Please call our service with questions.     Poppy Osorio MD, [facility], [address] [place] [address] [address] [address]  Ph: [phone]  Fax: [phone]

## 2020-11-13 NOTE — CARE COORDINATION
Case Management Assessment           Daily Note                 Date/ Time of Note: 11/13/2020 11:28 AM         Note completed by: Belinda Villa    Patient Name: Kumar Li  YOB: 1940    Diagnosis:CHF (congestive heart failure), NYHA class I, acute on chronic, combined ([facility] [I50.43]  CHF (congestive heart failure), NYHA class I, acute on chronic, combined ([facility] [I50.43]  Patient Admission Status: Inpatient    Date of Admission:11/11/2020  1:44 AM Length of Stay: 2 GLOS:      Current Plan of Care: admitted  For  Hypoxia:  Cardiology consulted  :    Echo: PENDING    :  IV lasix:  I & O    Out of Wrist restraints 11/12  @ 1330 . Nephrology following Cr  Improved  3.1 from 3.6 .   ________________________________________________________________________________________  PT AM-PAC:   / 24 per last evaluation on:  pending Ordered  11/13  @ 0800    OT AM-PAC:   / 24 per last evaluation on: pending Ordered  11/13  @ 0800    DME Needs for discharge: TBD   ________________________________________________________________________________________  Discharge Plan: Home with Home Health Care: if  rec  benefit from Out tp HF  program     Tentative discharge date: 1-2 days    Current barriers to discharge: Cardiology clearance      Referrals completed: [address] [facility]: [facility] following     Resources/ information provided: [address] [facility] [address] List  ________________________________________________________________________________________  Case Management Notes:    Patient usually lives at home with wife. Patient is an 79year old male admitted for CHF, AMS, s/p fall. Patient reports he is usually independent in ADLs. Patient reports he has home oxygen through [facility]. Patient may benefit from home care on d/c. [facility] following   Pt is out of  in soft wrist restraints as of 11/12  @ 1330  O 2  Weaned  From 6 L to 3 L nc  , baseline is 2  l nc .    Has  Home O 2  :    [address] [facility] [address] [address] , [address] [facility] [address]       Phone: [phone]       Fax: [phone]        Mook Garza and his family were provided with choice of provider; he and his family are in agreement with the discharge plan.     Care Transition Patient: Portia Gaona RN  [facility]  Case Management Department  Ph: [phone]

## 2020-11-13 NOTE — CONSULTS
Department of Orthopedic Surgery  Attending : Shirin Mcdonough MD  Consult Note        Reason for Consult:  Left hand pain, fall  Requesting Physician: Andrew Foote MD    CHIEF COMPLAINT:  As Above    History Obtained From:  patient    HISTORY OF PRESENT ILLNESS:                The patient is a 79 y.o. male who presents with above chief complaint. The patient presented with history of confusion and hypoxia several days ago and was admitted for further evaluation  He does have quite complex significant past medical history including extensive cardiac history  He was also noted to have a fall several days ago and had been complaining of left hand pain. X-rays were therefore obtained this morning and orthopedics was consulted for further evaluation  The patient has been wrapped in a bandage for reported skin tears to his left hand  He does continue to have confusion and is here with his wife at the bedside who provides most of the history this afternoon  No other acute complaints reported today      Past Medical History:        Diagnosis Date    Aortic dissection, thoracic ([address] 3/20/2017    Overview:  Emergency type I dissection repair 8/1/16 at [facility] in [address] [place] [address] Atrial fibrillation [facility])     Atrial fibrillation with rapid ventricular response ([address] 3/11/2017    Chronic diastolic congestive heart failure ([address] 3/29/2017    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 Louann Story at [facility] heart and vascular [place], [place] on August 1, 2016.       Hypertension     Intermittent complete heart block ([address] 8/25/2018    Psychiatric problem     Renal artery stenosis ([address] 10/10/2016    Renal failure     S/P knee replacement 12/12/2014    S/P thoracic aortic aneurysm repair negative for  chest pain, dyspnea, palpitations  GASTROINTESTINAL:  negative for nausea, vomiting and diarrhea  MUSCULOSKELETAL:  positive for  pain and stiffness left thumb    PHYSICAL EXAM:    awake, alert, cooperative, no apparent distress, and appears stated age  MUSCULOSKELETAL:  there is no redness, warmth, or swelling of the joints  full range of motion noted  motor strength is 5 out of 5 all extremities bilaterally  tone is normal  with exception of    Left hand/left upper extremity:  Dressing and bandage removed, there is evidence of skin tearing mainly near the dorsal aspect of the thumb extending to near the base of the thumb as well as involving a portion of the thumb webspace  Small amount of bleeding with no obvious hematoma, skin appears to be viable otherwise with mild swelling near the base of the thumb  No evidence of obvious additional deformity with appropriate finger tenodesis and resting cascade  Small skin tear near the dorsal ulnar subcutaneous border of the forearm with no swelling or additional skin changes  Patient demonstrates stiffness with flexion and extension of all fingers but overall intact flexion extension abduction of fingers  Limited but active flexion and extension of the left thumb  Gross sensation intact throughout fingertips median ulnar and radial aspect without deficit  Brisk capillary refill all fingers  There is no specific tenderness to palpation throughout the wrist including radiocarpal joint and distal radius and no tenderness near the base of the thumb  There is specific focus tenderness near skin tears and area of wound but no tenderness throughout the remainder of the hand  DATA:    CBC:   Recent Labs     11/11/20 0209   WBC 5.5   HGB 10.7*   PLT 78*     BMP:    Recent Labs     11/11/20  0209 11/12/20  0436 11/13/20  0929   * 139 138   K 3.6 3.3* 4.0   CL 98* 97* 93*   CO2 24 28 28   BUN 43* 39* 40*   CREATININE 3.6* 3.1* 3.2*   GLUCOSE 113* 88 113* INR: No results for input(s): INR in the last 72 hours. Radiology:   3 views of the left hand         HISTORY: Fall and pain.         FINDINGS:         There is widening of the scapholunate interosseous space. No acute fracture is seen.         There is severe osteoarthritis involving the trapeziometacarpal joint.              Impression    1. Widening of the scapholunate interval compatible with ligamentous injury, age-indeterminate. 2. Severe osteoarthritis involving the trapeziometacarpal joint. 3. No acute fracture.               Images personally reviewed by me of the left hand, no evidence of acute fracture, degenerative changes of the thumb CMC joint noted and SLAC wrist with scapholunate widening and radius scaphoid narrowing consistent with chronic injury and scapholunate advanced collapse    IMPRESSION/RECOMMENDATIONS:    Assessment: 34-year-old male presenting with history of recent admission for confusion and hypoxia also with history of fall several days ago reportedly  1. Left hand contusion and skin tear, evidence of degenerative changes of left thumb CMC joint and radiocarpal joint consistent with SLAC wrist  No evidence of acute fracture left hand or wrist    Plan:  1) I discussed with the patient and his wife today his x-rays today which did not demonstrate any acute fracture or acute process.   The scapholunate ligament injury is chronic with evidence of degenerative changes at the radiocarpal joint as well as at the base of the thumb  On examination today the patient is not specifically tender in his wrist and seems to be mostly bothered by his skin tear and area of contusion in this area  For now would recommend conservative management with local daily wound care to the thumb webspace and area of skin tear with Adaptic and 4 x 4 dressing and soft dressing  We will also provide the patient with a thumb spica brace that he may wear for comfort but does not have to wear if he is feeling more comfortable  Encourage finger and thumb range of motion otherwise as tolerated  The patient is welcome to see me as an outpatient if persistent pain or any new complaints regarding his hand or wrist, will monitor while inpatient next      Thank you for the opportunity to consult on this patient.     Jordon Lo MD    Hand & Upper Extremity Surgery  [address]  A partner of [address]

## 2020-11-13 NOTE — PROGRESS NOTES
[facility] NEPHROLOGY    [facility]              [phone]                      Rickie Peña is admitted with CHF. We are following for CKD with elevated Cr. Interval History and plan:      Patient was seen and examined at bedside this AM. He is a patient of Dr. Michael Bustillos. Patient has left AMA in previous admission. He denies any chest pain, SOB, fever, chills, nausea, vomiting. Patient is altered, so the information he provided is unreliable  Urine output is 750 ml. Blood pressure is stable    Creatinine is 3.1 and is close to baseline. Labs are pending from today. Change Lasix to 40 mg p.o. twice daily. Continue increase Toprol-XL. Assessment :     CKD IV -V  Cause of CKD from HTN, Lithium use, R renal artery stenosis, NSAID use and Cardiorenal syndrome. Cr today - 3.6. Baseline last year 2.7-2.8. Currently doesn't seem to be volume overloaded but Pro-BNP is 21,891. No crackles or edema on examination. He received 80 mg lasix IV in the ED. US from 2019 shows mass in R kidney, L kidney with cyst. His current creatinine seems to be his new baseline  from progression of his CKD. · Ultrasound of the kidney showed increased echogenicity and simple cyst in the left kidney. · PTH is stable at 93. · No need for phosphorus binders. · Avoid nephrotoxic agents   · He has minimal proteinuria. Hypertension   Patient is hypertensive. ·  Increase dose of metoprolol     Atrial fibrillation   · At home taking Metoprolol 25 mg BID. Will increase the dose for hypertension.    · Apixaban 2.5 mg BID     [facility] Nephrology would like to thank Rhoda Severance, MD   for opportunity to serve this patient      Please call with questions at-   24 Hrs Answering service [phone] or  7 am- 5 pm via Perfect serve or cell phone  Kenn Funez         CC/reason for consult :     CKD        HPI :     Kellee Young is a 79 y.o. male presented to   the hospital on 11/11/2020 with fall and AMS. Patient has a past medical history of a-fib, CHF, COPD, AAA. The patient is an unclear historian and wife told EMS that the patient has had increasing confusion over the last several days, but also notes that he does have some confusion with taking his night medications. The patient otherwise has no complaints at this time. On arrival, the patient was noted to be hypoxic with oxygen saturations in the high 80s on room air. ROS:     positives in bold   Constitutional:  fever, chills, weakness, weight change, fatigue  Skin:  rash, pruritus, hair loss, bruising, dry skin, petechiae  Head, Face, Neck   headaches, swelling,  cervical adenopathy  Respiratory: shortness of breath, cough, or wheezing  Cardiovascular: chest pain, palpitations, dizzy, edema  Gastrointestinal: nausea, vomiting, diarrhea, constipation,belly pain    Yellow skin, blood in stool  Musculoskeletal:  back pain, muscle weakness, gait problems,       joint pain or swelling. Genitourinary:  dysuria, poor urine flow, flank pain, blood in urine  Neurologic:  vertigo, TIA'S, syncope, seizures, focal weakness  Psychosocial:  insomnia, anxiety, or depression.                       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 Rachele Srinivasan at [facility] heart and vascular [place], [place] on August 1, 2016.  Hypertension     Intermittent complete heart block ([facility] 8/25/2018    Psychiatric problem     Renal artery stenosis ([facility] 10/10/2016    Renal failure     S/P knee replacement 12/12/2014    S/P thoracic aortic aneurysm repair 11/12/2016    Secondary hyperparathyroidism, renal ([facility] 7/19/2018    Thoracic aortic dissection ([facility] 10/10/2016    Overview:  Type A thoracic aortic dissection and aneurysm repair by Dr. Sergio Brown at [facility] heart and vascular [place], [place] on August 1, 2016.  Urinary retention with incomplete bladder emptying 3/19/2017       Past Surgical History:   Procedure Laterality Date    ABDOMINAL AORTIC ANEURYSM REPAIR      disected     HAND SURGERY      left hand        reports that he has never smoked. He has never used smokeless tobacco. He reports current alcohol use of about 6.0 standard drinks of alcohol per week. He reports that he does not use drugs. family history includes Alcohol Abuse in his son.          Medication:     Current Facility-Administered Medications: OLANZapine (ZYPREXA) injection 2.5 mg, 2.5 mg, Intramuscular, BID PRN  apixaban (ELIQUIS) tablet 2.5 mg, 2.5 mg, Oral, BID  atorvastatin (LIPITOR) tablet 10 mg, 10 mg, Oral, Nightly  QUEtiapine (SEROQUEL) tablet 200 mg, 200 mg, Oral, Nightly  sodium chloride flush 0.9 % injection 10 mL, 10 mL, Intravenous, 2 times per day  sodium chloride flush 0.9 % injection 10 mL, 10 mL, Intravenous, PRN  acetaminophen (TYLENOL) tablet 650 mg, 650 mg, Oral, Q4H PRN  metoprolol succinate (TOPROL XL) extended release tablet 50 mg, 50 mg, Oral, BID  furosemide (LASIX) injection 40 mg, 40 mg, Intravenous, BID       Vitals :     Vitals:    11/13/20 0835   BP:    Pulse:    Resp: 19   Temp:    SpO2: 100%       I & O :       Intake/Output Summary (Last 24 hours) at 11/13/2020 0858  Last data filed at 11/13/2020 0227  Gross per 24 hour   Intake 582 ml   Output 750 ml   Net -168 ml        Physical Examination :     General appearance: Anxious- no, distressed- no, in good spirits- yes    HEENT: Lips- normal, teeth- ok , oral mucosa- moist  Neck : Mass- no, appears symmetrical, JVD- not visible  Respiratory: Respiratory effort- normal  Cardiovascular:  Ausculation- systolic murmur   Abdomen: visible mass- no, distention- no, scar- no, tenderness- no                            hepatosplenomegaly-  no  Musculoskeletal:  clubbing no,cyanosis- no , digital ischemia- no                           muscle strength- grossly normal , tone - grossly normal  Skin: rashes- no , ulcers- no, induration- no, tightening - no  Psychiatric:  AAOX0       LABS:     Recent Labs     11/11/20  0209   WBC 5.5   HGB 10.7*   HCT 32.8*   PLT 78*     Recent Labs     11/11/20  0209 11/12/20  0436   * 139   K 3.6 3.3*   CL 98* 97*   CO2 24 28   BUN 43* 39*   CREATININE 3.6* 3.1*   GLUCOSE 113* 88   MG 2.30  --    PHOS 3.4 3.7       Thanks  Nephrology  Chr[address], [address]  Office: [phone]  Cell: [phone]  Fax: [phone]

## 2020-11-13 NOTE — PROGRESS NOTES
Relation Age of Onset    Alcohol Abuse Son        Physical Examination      Vitals:  BP (!) 128/95   Pulse 82   Temp 97.9 °F (36.6 °C) (Oral)   Resp 18   Ht 6' 2\" (1.88 m)   SpO2 92%   BMI 22.91 kg/m²   Temp (24hrs), Av.4 °F (36.3 °C), Min:96.5 °F (35.8 °C), Max:99.1 °F (37.3 °C)      I/O (24Hr): Intake/Output Summary (Last 24 hours) at 2020 1315  Last data filed at 2020 1030  Gross per 24 hour   Intake 462 ml   Output 600 ml   Net -138 ml       Physical Exam  Constitutional:       Comments: Awake, not in distress    HENT:      Head: Normocephalic and atraumatic. Neck:      Comments: JVD +  Cardiovascular:      Rate and Rhythm: Normal rate. Rhythm irregular. Pulses: Normal pulses. Pulmonary:      Effort: Pulmonary effort is normal.      Breath sounds: Normal breath sounds. Abdominal:      General: Abdomen is flat. Palpations: Abdomen is soft. Musculoskeletal:        Hands:    Skin:     General: Skin is warm. Capillary Refill: Capillary refill takes less than 2 seconds. Neurological:      General: No focal deficit present. Mental Status: He is oriented to person, place, and time. Psychiatric:         Mood and Affect: Mood normal.         Behavior: Behavior normal.           Labs/Imaging/Diagnostics   Labs:  CBC:   Recent Labs     20  0209   WBC 5.5   HGB 10.7*   HCT 32.8*   MCV 98.9   PLT 78*     BMP:   Recent Labs     20  0209 20  0436 20  0929   * 139 138   K 3.6 3.3* 4.0   CL 98* 97* 93*   CO2 24 28 28   PHOS 3.4 3.7 3.4   BUN 43* 39* 40*   CREATININE 3.6* 3.1* 3.2*     Mag:   Lab Results   Component Value Date    MG 2.30 2020     LIVER PROFILE: No results for input(s): AST, ALT, LIPASE, BILIDIR, BILITOT, ALKPHOS in the last 72 hours. Invalid input(s): AMYLASE,  ALB  PT/INR: No results for input(s): PROTIME, INR in the last 72 hours. APTT: No results for input(s): APTT in the last 72 hours.   BNP:  No results for

## 2020-11-14 LAB
ALBUMIN SERPL-MCNC: 4.2 G/DL (ref 3.4–5)
ANION GAP SERPL CALCULATED.3IONS-SCNC: 14 MMOL/L (ref 3–16)
BUN BLDV-MCNC: 41 MG/DL (ref 7–20)
CALCIUM SERPL-MCNC: 9.1 MG/DL (ref 8.3–10.6)
CHLORIDE BLD-SCNC: 97 MMOL/L (ref 99–110)
CO2: 27 MMOL/L (ref 21–32)
CREAT SERPL-MCNC: 3.3 MG/DL (ref 0.8–1.3)
GFR AFRICAN AMERICAN: 22
GFR NON-AFRICAN AMERICAN: 18
GLUCOSE BLD-MCNC: 100 MG/DL (ref 70–99)
PHOSPHORUS: 3.5 MG/DL (ref 2.5–4.9)
POTASSIUM SERPL-SCNC: 3.9 MMOL/L (ref 3.5–5.1)
SODIUM BLD-SCNC: 138 MMOL/L (ref 136–145)
VITAMIN D 25-HYDROXY: 41.7 NG/ML

## 2020-11-14 PROCEDURE — 2500000003 HC RX 250 WO HCPCS: Performed by: INTERNAL MEDICINE

## 2020-11-14 PROCEDURE — 97116 GAIT TRAINING THERAPY: CPT

## 2020-11-14 PROCEDURE — 2060000000 HC ICU INTERMEDIATE R&B

## 2020-11-14 PROCEDURE — 80069 RENAL FUNCTION PANEL: CPT

## 2020-11-14 PROCEDURE — 6370000000 HC RX 637 (ALT 250 FOR IP): Performed by: INTERNAL MEDICINE

## 2020-11-14 PROCEDURE — 36415 COLL VENOUS BLD VENIPUNCTURE: CPT

## 2020-11-14 PROCEDURE — 97162 PT EVAL MOD COMPLEX 30 MIN: CPT

## 2020-11-14 PROCEDURE — 2580000003 HC RX 258: Performed by: INTERNAL MEDICINE

## 2020-11-14 RX ADMIN — METOPROLOL SUCCINATE 50 MG: 50 TABLET, EXTENDED RELEASE ORAL at 20:00

## 2020-11-14 RX ADMIN — ATORVASTATIN CALCIUM 10 MG: 20 TABLET, FILM COATED ORAL at 20:01

## 2020-11-14 RX ADMIN — FUROSEMIDE 40 MG: 40 TABLET ORAL at 09:23

## 2020-11-14 RX ADMIN — FUROSEMIDE 40 MG: 40 TABLET ORAL at 16:44

## 2020-11-14 RX ADMIN — QUETIAPINE FUMARATE 200 MG: 100 TABLET ORAL at 20:01

## 2020-11-14 RX ADMIN — METOPROLOL SUCCINATE 50 MG: 50 TABLET, EXTENDED RELEASE ORAL at 09:23

## 2020-11-14 RX ADMIN — TRANYLCYPROMINE 20 MG: 10 TABLET ORAL at 09:23

## 2020-11-14 RX ADMIN — APIXABAN 2.5 MG: 5 TABLET, FILM COATED ORAL at 09:24

## 2020-11-14 RX ADMIN — Medication 10 ML: at 20:02

## 2020-11-14 RX ADMIN — OLANZAPINE 2.5 MG: 10 INJECTION, POWDER, LYOPHILIZED, FOR SOLUTION INTRAMUSCULAR at 15:53

## 2020-11-14 RX ADMIN — Medication 10 ML: at 09:25

## 2020-11-14 RX ADMIN — TRANYLCYPROMINE 20 MG: 10 TABLET ORAL at 20:01

## 2020-11-14 RX ADMIN — APIXABAN 2.5 MG: 5 TABLET, FILM COATED ORAL at 20:01

## 2020-11-14 RX ADMIN — ACETAMINOPHEN 650 MG: 325 TABLET ORAL at 20:01

## 2020-11-14 ASSESSMENT — PAIN SCALES - GENERAL: PAINLEVEL_OUTOF10: 3

## 2020-11-14 ASSESSMENT — PAIN DESCRIPTION - PROGRESSION
CLINICAL_PROGRESSION: NOT CHANGED

## 2020-11-14 ASSESSMENT — PAIN DESCRIPTION - DESCRIPTORS: DESCRIPTORS: ACHING

## 2020-11-14 ASSESSMENT — PAIN DESCRIPTION - LOCATION: LOCATION: HAND

## 2020-11-14 ASSESSMENT — PAIN DESCRIPTION - ONSET: ONSET: ON-GOING

## 2020-11-14 ASSESSMENT — PAIN DESCRIPTION - FREQUENCY: FREQUENCY: CONTINUOUS

## 2020-11-14 ASSESSMENT — PAIN - FUNCTIONAL ASSESSMENT: PAIN_FUNCTIONAL_ASSESSMENT: ACTIVITIES ARE NOT PREVENTED

## 2020-11-14 ASSESSMENT — PAIN DESCRIPTION - PAIN TYPE: TYPE: ACUTE PAIN

## 2020-11-14 ASSESSMENT — PAIN DESCRIPTION - ORIENTATION: ORIENTATION: LEFT

## 2020-11-14 NOTE — PROGRESS NOTES
[facility] NEPHROLOGY    [facility]              [phone]                      Elizabeth Laboy is admitted with CHF. We are following for CKD with elevated Cr. Interval History and plan:      Patient was seen and examined at bedside this AM. He is a patient of Dr. Jelena Manning. Patient has left AMA in previous admission. He denies any chest pain, SOB, fever, chills, nausea, vomiting. Patient is altered, so the information he provided is unreliable    Orthopedic note was reviewed. Urine output is 875 ml. Blood pressure is stable    Creatinine is 3.3 . Continue Lasix p.o. twice daily  Continue  Toprol-XL. Assessment :     CKD IV -V  Cause of CKD from HTN, Lithium use, R renal artery stenosis, NSAID use and Cardiorenal syndrome. Cr today - 3.6. Baseline last year 2.7-2.8. Currently doesn't seem to be volume overloaded but Pro-BNP is 21,891. No crackles or edema on examination. He received 80 mg lasix IV in the ED. US from 2019 shows mass in R kidney, L kidney with cyst. His current creatinine seems to be his new baseline  from progression of his CKD. · Ultrasound of the kidney showed increased echogenicity and simple cyst in the left kidney. · PTH is stable at 93. · No need for phosphorus binders. · Avoid nephrotoxic agents   · He has minimal proteinuria. Hypertension   Patient is hypertensive. ·  Increase dose of metoprolol     Atrial fibrillation   · At home taking Metoprolol 25 mg BID. Will increase the dose for hypertension. · Apixaban 2.5 mg BID     He had a grade 3 diastolic function with 70-54 % LVEF.     [facility] Nephrology would like to thank Mary Quintana MD   for opportunity to serve this patient      Please call with questions at-   24 Hrs Answering service [phone] or  7 am- 5 pm via Perfect serve or cell phone  Tasha Tello         CC/reason for consult :     CKD        HPI :     Cindy Hampton is a 79 y.o. male presented to   the hospital on 11/11/2020 with fall and AMS. Patient has a past medical history of a-fib, CHF, COPD, AAA. The patient is an unclear historian and wife told EMS that the patient has had increasing confusion over the last several days, but also notes that he does have some confusion with taking his night medications. The patient otherwise has no complaints at this time. On arrival, the patient was noted to be hypoxic with oxygen saturations in the high 80s on room air. ROS:     positives in bold   Constitutional:  fever, chills, weakness, weight change, fatigue  Skin:  rash, pruritus, hair loss, bruising, dry skin, petechiae  Head, Face, Neck   headaches, swelling,  cervical adenopathy  Respiratory: shortness of breath, cough, or wheezing  Cardiovascular: chest pain, palpitations, dizzy, edema  Gastrointestinal: nausea, vomiting, diarrhea, constipation,belly pain    Yellow skin, blood in stool  Musculoskeletal:  back pain, muscle weakness, gait problems,       joint pain or swelling. Genitourinary:  dysuria, poor urine flow, flank pain, blood in urine  Neurologic:  vertigo, TIA'S, syncope, seizures, focal weakness  Psychosocial:  insomnia, anxiety, or depression.                       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 Katie Johns at [facility] heart and vascular [place], [place] on August 1, 2016.  Hypertension     Intermittent complete heart block ([facility] 8/25/2018    Psychiatric problem     Renal artery stenosis ([facility] 10/10/2016    Renal failure     S/P knee replacement 12/12/2014    S/P thoracic aortic aneurysm repair 11/12/2016    Secondary hyperparathyroidism, renal ([facility] 7/19/2018    Thoracic aortic dissection ([facility] 10/10/2016    Overview:  Type A thoracic aortic dissection and aneurysm repair by Dr. Bairon Arias at [facility] heart and vascular [place], [place] on August 1, 2016.  Urinary retention with incomplete bladder emptying 3/19/2017       Past Surgical History:   Procedure Laterality Date    ABDOMINAL AORTIC ANEURYSM REPAIR      disected     HAND SURGERY      left hand        reports that he has never smoked. He has never used smokeless tobacco. He reports current alcohol use of about 6.0 standard drinks of alcohol per week. He reports that he does not use drugs. family history includes Alcohol Abuse in his son.          Medication:     Current Facility-Administered Medications: furosemide (LASIX) tablet 40 mg, 40 mg, Oral, BID  tranylcypromine (PARNATE) tablet 20 mg, 20 mg, Oral, BID  OLANZapine (ZYPREXA) injection 2.5 mg, 2.5 mg, Intramuscular, BID PRN  apixaban (ELIQUIS) tablet 2.5 mg, 2.5 mg, Oral, BID  atorvastatin (LIPITOR) tablet 10 mg, 10 mg, Oral, Nightly  QUEtiapine (SEROQUEL) tablet 200 mg, 200 mg, Oral, Nightly  sodium chloride flush 0.9 % injection 10 mL, 10 mL, Intravenous, 2 times per day  sodium chloride flush 0.9 % injection 10 mL, 10 mL, Intravenous, PRN  acetaminophen (TYLENOL) tablet 650 mg, 650 mg, Oral, Q4H PRN  metoprolol succinate (TOPROL XL) extended release tablet 50 mg, 50 mg, Oral, BID       Vitals :     Vitals:    11/14/20 0804   BP: 131/88   Pulse: 76   Resp: 18   Temp: 97.6 °F (36.4 °C)   SpO2: 94%       I & O :       Intake/Output Summary (Last 24 hours) at 11/14/2020 8304  Last data filed at 11/14/2020 0457  Gross per 24 hour   Intake 920 ml   Output 875 ml   Net 45 ml        Physical Examination :     General appearance: Anxious- no, distressed- no, in good spirits- yes    HEENT: Lips- normal, teeth- ok , oral mucosa- moist  Neck : Mass- no, appears symmetrical, JVD- not visible  Respiratory: Respiratory effort- normal  Cardiovascular: Ausculation- systolic murmur   Abdomen: visible mass- no, distention- no, scar- no, tenderness- no                            hepatosplenomegaly-  no  Musculoskeletal:  clubbing no,cyanosis- no , digital ischemia- no                           muscle strength- grossly normal , tone - grossly normal  Skin: rashes- no , ulcers- no, induration- no, tightening - no  Psychiatric:  AAOX0       LABS:     No results for input(s): WBC, HGB, HCT, PLT in the last 72 hours.   Recent Labs     11/12/20  0436 11/13/20  0929 11/14/20  0623    138 138   K 3.3* 4.0 3.9   CL 97* 93* 97*   CO2 28 28 27   BUN 39* 40* 41*   CREATININE 3.1* 3.2* 3.3*   GLUCOSE 88 113* 100*   PHOS 3.7 3.4 3.5       Thanks  Nephrology  Chriss Aguero [address], [address]  Office: [phone]  Cell: [phone]  Fax: [phone]

## 2020-11-14 NOTE — PROGRESS NOTES
and oriented, thought content appropriate, normal insight  Capillary Refill: Brisk,< 3 seconds   Peripheral Pulses: +2 palpable, equal bilaterally       Labs:   No results for input(s): WBC, HGB, HCT, PLT in the last 72 hours. Recent Labs     11/12/20  0436 11/13/20  0929 11/14/20  0623    138 138   K 3.3* 4.0 3.9   CL 97* 93* 97*   CO2 28 28 27   BUN 39* 40* 41*   CREATININE 3.1* 3.2* 3.3*   CALCIUM 8.9 8.7 9.1   PHOS 3.7 3.4 3.5     No results for input(s): AST, ALT, BILIDIR, BILITOT, ALKPHOS in the last 72 hours. No results for input(s): INR in the last 72 hours. No results for input(s): Grazyna Lager in the last 72 hours. Urinalysis:      Lab Results   Component Value Date    NITRU Negative 11/11/2020    WBCUA 0-2 11/11/2020    RBCUA None seen 11/11/2020    BLOODU TRACE-INTACT 11/11/2020    SPECGRAV 1.020 11/11/2020    GLUCOSEU Negative 11/11/2020       Radiology:  XR HAND LEFT (MIN 3 VIEWS)   Final Result   1. Widening of the scapholunate interval compatible with ligamentous injury, age-indeterminate. 2. Severe osteoarthritis involving the trapeziometacarpal joint. 3. No acute fracture. US RENAL COMPLETE   Final Result      1. Increased echogenicity throughout the renal cortex and some renal atrophy without stone disease or hydronephrosis. 2. Simple cysts identified in left kidney measuring up to 1.9 x 1.7 cm   3. Urinary bladder not well distended but unremarkable with no gross stone disease               CT CERVICAL SPINE WO CONTRAST   Final Result     No acute findings in the cervical spine. CT HEAD WO CONTRAST   Final Result     No acute infarct, hemorrhage, mass or edema. XR CHEST PORTABLE   Final Result     No acute findings in the chest.                  Assessment/Plan:    Active Hospital Problems    Diagnosis    CHF (congestive heart failure), NYHA class I, acute on chronic, combined ([facility] [I50.43]     1.   Admitted with acute congestive heart failure with preserved EF continue with the Lasix. 2.  Acute kidney injury on chronic kidney disease nephrology consulted and following. 3.  Atrial fibrillation rate is controlled on chronic anticoagulation with Eliquis. 4.  Thrombocytopenia chronic stable. 5.  Depression continue with home medication.     DVT Prophylaxis: Eliquis  Diet: DIET LOW SODIUM 2 GM;  Dietary Nutrition Supplements: Standard High Calorie Oral Supplement  Code Status: Full Code    PT/OT Eval Status:     Pete Sarmiento MD

## 2020-11-14 NOTE — PROGRESS NOTES
Dressing changed on left hand abrasion with vaseline gauze and regular gauze. Edema and warmth noted on assessment. Patient also complains hand is painful to the touch. Dr. Ophelia Maurer was notified.

## 2020-11-14 NOTE — PLAN OF CARE
Problem: Falls - Risk of:  Goal: Will remain free from falls  Description: Will remain free from falls  Outcome: Ongoing   Pt not using call light for assistance. Setting of bed alarm to go to bathroom. Standby assist given. Pt remains free from falls. Continue with fall precautions.

## 2020-11-14 NOTE — PROGRESS NOTES
Physical Therapy    Facility/Department: [address]  Initial Assessment and treatment    NAME: Keith Santiago  : 1940  MRN: [id number]    Date of Service: 2020    Discharge Recommendations:    Keith Santiago scored a 18/24 on the AM-PAC short mobility form. Current research shows that an AM-PAC score of 18 or greater is typically associated with a discharge to the patient's home setting. Based on the patient's AM-PAC score and their current functional mobility deficits, it is recommended that the patient have 2-3 sessions per week of Physical Therapy at d/c to increase the patient's independence. At this time, this patient demonstrates the endurance and safety to discharge home with home PT and a follow up treatment frequency of 2-3x/wk. Please see assessment section for further patient specific details. HOME HEALTH CARE: LEVEL 1 STANDARD    - Initial home health evaluation to occur within 24-48 hours, in patient home   - Therapy to evaluate with goal of regaining prior level of functioning   - Therapy to evaluate if patient has [address] needs for personal care      PT Equipment Recommendations  Equipment Needed: No    Assessment   Body structures, Functions, Activity limitations: Decreased functional mobility ; Decreased safe awareness;Decreased balance;Decreased endurance  Assessment: Patient tolerated session well, transferring and ambulating in room and hallways as above with fairly steady gait without an assistive device. Patient is a questionable historian and confused to conversation at times, fixated on when he will be discharged and how he is getting home. Patient re-direceted with verbal cues. Patient reports living with family and independence with ADLs. He is currently functioning below baseline level. Recommend continued skilled PT upon discharge. Will follow while in acute care setting to maximize independence with mobility.   Treatment Diagnosis: repetition  Attention Span: Attends with cues to redirect  Memory: Decreased short term memory;Decreased recall of biographical Information;Decreased recall of recent events  Safety Judgement: Decreased awareness of need for assistance;Decreased awareness of need for safety  Problem Solving: Decreased awareness of errors  Insights: Decreased awareness of deficits  Initiation: Requires cues for some  Sequencing: Does not require cues    Objective          AROM RLE (degrees)  RLE AROM: WFL  AROM LLE (degrees)  LLE AROM : WFL  Strength RLE  Strength RLE: WFL  Strength LLE  Strength LLE: WFL        Bed mobility  Supine to Sit: Stand by assistance(head of bed elevated)  Scooting: Stand by assistance(to EOB)  Comment: patient sitting up in bedside chair at end of session  Transfers  Sit to Stand: Contact guard assistance(from EOB)  Stand to sit: Contact guard assistance(to bedside chair)  Ambulation  Ambulation?: Yes  Ambulation 1  Surface: level tile  Device: No Device  Assistance: Contact guard assistance  Quality of Gait: gait fairly steady, no overt loss of balance noted, increased knee flexion during stance, moderate lanny  Distance: 200' in room and hallways returning to bedside chair  Stairs/Curb  Stairs?: No     Balance  Sitting - Static: Fair;+(supervision)  Standing - Static: Fair  Standing - Dynamic: Fair(CGA to ambulate without an assistive device)        Plan   Plan  Times per week: 2-5  Current Treatment Recommendations: Strengthening, Transfer Training, Endurance Training, Patient/Caregiver Education & Training, Balance Training, Gait Training, Functional Mobility Training, Stair training, Safety Education & Training  Safety Devices  Type of devices: Left in chair, Chair alarm in place, Call light within reach, Nurse notified, Gait belt       OutComes Score                                                  AM-PAC Score  AM-PAC Inpatient Mobility Raw Score : 18 (11/14/20 8372)  AM-PAC Inpatient T-Scale Score : 43.63 (11/14/20 0913)  Mobility Inpatient CMS 0-100% Score: 46.58 (11/14/20 0913)  Mobility Inpatient CMS G-Code Modifier : CK (11/14/20 0913)          Goals  Short term goals  Time Frame for Short term goals: discharge  Short term goal 1: patient will perform bed mobility with modified independence  Short term goal 2: patient will perform transfers sit<>stand with supervision  Short term goal 3: patient will ambulate 250' without an assistive device with supervision  Short term goal 4: patient will ascend/descend 12 steps with unilateral handrail and supervision  Patient Goals   Patient goals : to go home       Therapy Time   Individual Concurrent Group Co-treatment   Time In 0751         Time Out 0818         Minutes 27         Timed Code Treatment Minutes: 12 Minutes    Timed Code Treatment Minutes:  12 Minutes    Total Treatment Minutes:    12 minutes treatment + 15 minutes evaluation = 27 total treatment minutes  If patient discharges prior to next session this note will serve as a discharge summary. Please see below for the latest assessment towards goals.    [address]

## 2020-11-14 NOTE — PROGRESS NOTES
Patient complaining abrasion on left hand is painful to touch after dressing was changed. Upon assessment, edema was noted. Hand is also warm to touch. Dr. Dorina Foote notified. Waiting for response.

## 2020-11-15 VITALS
DIASTOLIC BLOOD PRESSURE: 73 MMHG | TEMPERATURE: 98.1 F | OXYGEN SATURATION: 93 % | HEIGHT: 74 IN | WEIGHT: 165.12 LBS | HEART RATE: 67 BPM | BODY MASS INDEX: 21.19 KG/M2 | SYSTOLIC BLOOD PRESSURE: 121 MMHG | RESPIRATION RATE: 18 BRPM

## 2020-11-15 LAB
ALBUMIN SERPL-MCNC: 4 G/DL (ref 3.4–5)
ANION GAP SERPL CALCULATED.3IONS-SCNC: 12 MMOL/L (ref 3–16)
BUN BLDV-MCNC: 48 MG/DL (ref 7–20)
CALCIUM SERPL-MCNC: 9.2 MG/DL (ref 8.3–10.6)
CHLORIDE BLD-SCNC: 96 MMOL/L (ref 99–110)
CO2: 26 MMOL/L (ref 21–32)
CREAT SERPL-MCNC: 3.5 MG/DL (ref 0.8–1.3)
GFR AFRICAN AMERICAN: 20
GFR NON-AFRICAN AMERICAN: 17
GLUCOSE BLD-MCNC: 133 MG/DL (ref 70–99)
PHOSPHORUS: 3.9 MG/DL (ref 2.5–4.9)
POTASSIUM SERPL-SCNC: 3.8 MMOL/L (ref 3.5–5.1)
SODIUM BLD-SCNC: 134 MMOL/L (ref 136–145)

## 2020-11-15 PROCEDURE — 6370000000 HC RX 637 (ALT 250 FOR IP): Performed by: INTERNAL MEDICINE

## 2020-11-15 PROCEDURE — 97535 SELF CARE MNGMENT TRAINING: CPT

## 2020-11-15 PROCEDURE — 97166 OT EVAL MOD COMPLEX 45 MIN: CPT

## 2020-11-15 PROCEDURE — 36415 COLL VENOUS BLD VENIPUNCTURE: CPT

## 2020-11-15 PROCEDURE — 80069 RENAL FUNCTION PANEL: CPT

## 2020-11-15 PROCEDURE — 2580000003 HC RX 258: Performed by: INTERNAL MEDICINE

## 2020-11-15 RX ORDER — FUROSEMIDE 40 MG/1
40 TABLET ORAL DAILY
Status: DISCONTINUED | OUTPATIENT
Start: 2020-11-16 | End: 2020-11-15 | Stop reason: HOSPADM

## 2020-11-15 RX ORDER — METOPROLOL SUCCINATE 50 MG/1
50 TABLET, EXTENDED RELEASE ORAL 2 TIMES DAILY
Qty: 30 TABLET | Refills: 3 | Status: SHIPPED | OUTPATIENT
Start: 2020-11-15

## 2020-11-15 RX ADMIN — APIXABAN 2.5 MG: 5 TABLET, FILM COATED ORAL at 08:41

## 2020-11-15 RX ADMIN — METOPROLOL SUCCINATE 50 MG: 50 TABLET, EXTENDED RELEASE ORAL at 08:41

## 2020-11-15 RX ADMIN — FUROSEMIDE 40 MG: 40 TABLET ORAL at 08:41

## 2020-11-15 RX ADMIN — TRANYLCYPROMINE 20 MG: 10 TABLET ORAL at 08:44

## 2020-11-15 RX ADMIN — Medication 10 ML: at 08:44

## 2020-11-15 ASSESSMENT — PAIN DESCRIPTION - DESCRIPTORS: DESCRIPTORS: ACHING

## 2020-11-15 ASSESSMENT — PAIN DESCRIPTION - PROGRESSION
CLINICAL_PROGRESSION: NOT CHANGED

## 2020-11-15 ASSESSMENT — PAIN DESCRIPTION - PAIN TYPE: TYPE: ACUTE PAIN

## 2020-11-15 ASSESSMENT — PAIN DESCRIPTION - FREQUENCY: FREQUENCY: CONTINUOUS

## 2020-11-15 ASSESSMENT — PAIN DESCRIPTION - ONSET: ONSET: ON-GOING

## 2020-11-15 ASSESSMENT — PAIN DESCRIPTION - ORIENTATION: ORIENTATION: LEFT

## 2020-11-15 ASSESSMENT — PAIN DESCRIPTION - LOCATION: LOCATION: HAND

## 2020-11-15 ASSESSMENT — PAIN SCALES - GENERAL: PAINLEVEL_OUTOF10: 6

## 2020-11-15 NOTE — PROGRESS NOTES
[facility] NEPHROLOGY    [facility]              [phone]                      Kike Johnson is admitted with CHF. We are following for CKD with elevated Cr. Interval History and plan:      Patient was seen and examined at bedside this AM. He is a patient of Dr. Roselinda Hamman. Patient has left AMA in previous admission. He denies any chest pain, SOB, fever, chills, nausea, vomiting. Patient is altered, so the information he provided is unreliable    Orthopedic note was reviewed. Urine output is 300 ml. Blood pressure is stable    Creatinine is worsening 3.5 . Decrease Lasix daily. Continue  Toprol-XL. Assessment :     CKD IV -V  Cause of CKD from HTN, Lithium use, R renal artery stenosis, NSAID use and Cardiorenal syndrome. Cr today - 3.6. Baseline last year 2.7-2.8. Currently doesn't seem to be volume overloaded but Pro-BNP is 21,891. No crackles or edema on examination. He received 80 mg lasix IV in the ED. US from 2019 shows mass in R kidney, L kidney with cyst. His current creatinine seems to be his new baseline  from progression of his CKD. · Ultrasound of the kidney showed increased echogenicity and simple cyst in the left kidney. · PTH is stable at 93. · No need for phosphorus binders. · Avoid nephrotoxic agents   · He has minimal proteinuria. Hypertension   Patient is hypertensive. ·  Increase dose of metoprolol     Atrial fibrillation   · At home taking Metoprolol 25 mg BID. Will increase the dose for hypertension. · Apixaban 2.5 mg BID     He had a grade 3 diastolic function with 03-25 % LVEF.     [facility] Nephrology would like to thank Carson Guerrero MD   for opportunity to serve this patient      Please call with questions at-   24 Hrs Answering service [phone] or  7 am- 5 pm via Perfect serve or cell phone  Nile Hahn         CC/reason for consult :     CKD        HPI :     Nabila Thompson is a 79 y.o. male presented to   the hospital on 11/11/2020 with fall and AMS. Patient has a past medical history of a-fib, CHF, COPD, AAA. The patient is an unclear historian and wife told EMS that the patient has had increasing confusion over the last several days, but also notes that he does have some confusion with taking his night medications. The patient otherwise has no complaints at this time. On arrival, the patient was noted to be hypoxic with oxygen saturations in the high 80s on room air. ROS:     positives in bold   Constitutional:  fever, chills, weakness, weight change, fatigue  Skin:  rash, pruritus, hair loss, bruising, dry skin, petechiae  Head, Face, Neck   headaches, swelling,  cervical adenopathy  Respiratory: shortness of breath, cough, or wheezing  Cardiovascular: chest pain, palpitations, dizzy, edema  Gastrointestinal: nausea, vomiting, diarrhea, constipation,belly pain    Yellow skin, blood in stool  Musculoskeletal:  back pain, muscle weakness, gait problems,       joint pain or swelling. Genitourinary:  dysuria, poor urine flow, flank pain, blood in urine  Neurologic:  vertigo, TIA'S, syncope, seizures, focal weakness  Psychosocial:  insomnia, anxiety, or depression.                       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 Maritza Iqbal at [facility] heart and vascular [place], [place] on August 1, 2016.  Hypertension     Intermittent complete heart block ([facility] [address] 8/25/2018    Psychiatric problem     Renal artery stenosis ([facility] [address] 10/10/2016    Renal failure     S/P knee replacement 12/12/2014    S/P thoracic aortic aneurysm repair 11/12/2016    Secondary hyperparathyroidism, renal ([facility] [address] 7/19/2018    Thoracic aortic dissection ([facility] [address] 10/10/2016    Overview:  Type A thoracic aortic dissection and aneurysm repair by Dr. Maritza Iqbal at [facility] heart and vascular [place], [place] on August 1, 2016.  Urinary retention with incomplete bladder emptying 3/19/2017       Past Surgical History:   Procedure Laterality Date    ABDOMINAL AORTIC ANEURYSM REPAIR      disected     HAND SURGERY      left hand        reports that he has never smoked. He has never used smokeless tobacco. He reports current alcohol use of about 6.0 standard drinks of alcohol per week. He reports that he does not use drugs. family history includes Alcohol Abuse in his son.          Medication:     Current Facility-Administered Medications: furosemide (LASIX) tablet 40 mg, 40 mg, Oral, BID  tranylcypromine (PARNATE) tablet 20 mg, 20 mg, Oral, BID  OLANZapine (ZYPREXA) injection 2.5 mg, 2.5 mg, Intramuscular, BID PRN  apixaban (ELIQUIS) tablet 2.5 mg, 2.5 mg, Oral, BID  atorvastatin (LIPITOR) tablet 10 mg, 10 mg, Oral, Nightly  QUEtiapine (SEROQUEL) tablet 200 mg, 200 mg, Oral, Nightly  sodium chloride flush 0.9 % injection 10 mL, 10 mL, Intravenous, 2 times per day  sodium chloride flush 0.9 % injection 10 mL, 10 mL, Intravenous, PRN  acetaminophen (TYLENOL) tablet 650 mg, 650 mg, Oral, Q4H PRN  metoprolol succinate (TOPROL XL) extended release tablet 50 mg, 50 mg, Oral, BID       Vitals :     Vitals:    11/15/20 0841   BP: 110/68   Pulse: 85   Resp:    Temp:    SpO2:        I & O :       Intake/Output Summary (Last 24 hours) at 11/15/2020 0843  Last data filed at 11/14/2020

## 2020-11-15 NOTE — PLAN OF CARE
Problem: Pain:  Goal: Pain level will decrease  Description: Pain level will decrease  Outcome: Ongoing   Medicated with Tylenol 650 mg po for complaints of left hand pain. Dressing C/D/I. Fingers swollen, positive radial pulse noted. Will continue to monitor alteration in comfort.

## 2020-11-15 NOTE — DISCHARGE SUMMARY
Hospital Medicine Discharge Summary    Patient ID: Corina Carney      Patient's PCP: René Ingram Date: 11/11/2020     Discharge Date: 11/15/2020     Admitting Physician: Allie Morris MD     Discharge Physician: Kylah Alonso MD     Discharge Diagnoses: Active Hospital Problems    Diagnosis    CHF (congestive heart failure), NYHA class I, acute on chronic, combined ([facility] [address] [I50.43]       The patient was seen and examined on day of discharge and this discharge summary is in conjunction with any daily progress note from day of discharge. Hospital Course:  1.  Admitted with acute congestive heart failure with preserved EF continue with the Lasix, Lasix dose was reduced per nephrology due to worsening creatinine today. At home patient to continue with the 20 mg p.o. twice daily Lasix dose. 2.  Acute kidney injury on chronic kidney disease nephrology consulted and following. 3.  Atrial fibrillation rate is controlled on chronic anticoagulation with Eliquis. Toprol XL dose was increased during hospitalization from 25 daily to 50 mg twice daily, patient to follow-up with PCP within a week for further adjustment of medication. 4.  Thrombocytopenia chronic stable. 5.  Depression continue with home medication. 6.  Left hand injury patient was seen evaluated by orthopedic no fracture no surgery needed, recommended conservative management with a local daily wound care to the thumb workplace and area for skin tear with adaptic and 4 x 4 dressing and soft dressing, also provided a thumb spica Ace that he can wear for comfort. 7.  Today patient requesting to go home as well as family wants him to be discharged if okay with the nephrology will discharge patient home today to follow-up with the nephrology with renal function.         Physical Exam Performed:     /73   Pulse 67   Temp 98.1 °F (36.7 °C) (Oral)   Resp 18   Ht 6' 2\" (1.88 m)   Wt 165 lb 2 oz (74.9 kg) SpO2 93%   BMI 21.20 kg/m²       General appearance:  No apparent distress, appears stated age and cooperative. HEENT:  Normal cephalic, atraumatic without obvious deformity. Pupils equal, round, and reactive to light. Extra ocular muscles intact. Conjunctivae/corneas clear. Neck: Supple, with full range of motion. No jugular venous distention. Trachea midline. Respiratory:  Normal respiratory effort. Clear to auscultation, bilaterally without Rales/Wheezes/Rhonchi. Cardiovascular:  Regular rate and rhythm with normal S1/S2 without murmurs, rubs or gallops. Abdomen: Soft, non-tender, non-distended with normal bowel sounds. Musculoskeletal:  No clubbing, cyanosis or edema bilaterally. Full range of motion without deformity. Left hand in dressing slight edema moving all fingers. Skin: Skin color, texture, turgor normal.  No rashes or lesions. Neurologic:  Neurovascularly intact without any focal sensory/motor deficits. Cranial nerves: II-XII intact, grossly non-focal.  Psychiatric:  Alert and oriented, thought content appropriate, normal insight  Capillary Refill: Brisk,< 3 seconds   Peripheral Pulses: +2 palpable, equal bilaterally       Labs: For convenience and continuity at follow-up the following most recent labs are provided:      CBC:    Lab Results   Component Value Date    WBC 5.5 11/11/2020    HGB 10.7 11/11/2020    HCT 32.8 11/11/2020    PLT 78 11/11/2020       Renal:    Lab Results   Component Value Date     11/15/2020    K 3.8 11/15/2020    K 4.7 02/28/2019    CL 96 11/15/2020    CO2 26 11/15/2020    BUN 48 11/15/2020    CREATININE 3.5 11/15/2020    CALCIUM 9.2 11/15/2020    PHOS 3.9 11/15/2020         Significant Diagnostic Studies    Radiology:   XR HAND LEFT (MIN 3 VIEWS)   Final Result   1. Widening of the scapholunate interval compatible with ligamentous injury, age-indeterminate. 2. Severe osteoarthritis involving the trapeziometacarpal joint. 3. No acute fracture. US RENAL COMPLETE   Final Result      1. Increased echogenicity throughout the renal cortex and some renal atrophy without stone disease or hydronephrosis. 2. Simple cysts identified in left kidney measuring up to 1.9 x 1.7 cm   3. Urinary bladder not well distended but unremarkable with no gross stone disease               CT CERVICAL SPINE WO CONTRAST   Final Result     No acute findings in the cervical spine. CT HEAD WO CONTRAST   Final Result     No acute infarct, hemorrhage, mass or edema.           XR CHEST PORTABLE   Final Result     No acute findings in the chest.                 Consults:     IP CONSULT TO HOSPITALIST  IP CONSULT TO NEPHROLOGY  IP CONSULT TO CARDIOLOGY  IP CONSULT TO SOCIAL WORK  PHARMACY TO DOSE MEDICATION  IP CONSULT TO ORTHOPEDIC SURGERY  PHARMACY TO DOSE MEDICATION  IP CONSULT TO HOME CARE NEEDS    Disposition: Home with family member    Condition at Discharge: Stable    Discharge Instructions/Follow-up: Follow-up with PCP within a week nephrology as instructed    Code Status:  Full Code     Activity: activity as tolerated    Diet: Low-sodium 2 g      Discharge Medications:     Discharge Medication List as of 11/15/2020  4:27 PM           Details   metoprolol succinate (TOPROL XL) 50 MG extended release tablet Take 1 tablet by mouth 2 times daily, Disp-30 tablet,R-3Normal              Details   furosemide (LASIX) 20 MG tablet Take 20 mg by mouth 2 times dailyHistorical Med      QUEtiapine (SEROQUEL) 200 MG tablet Take 200 mg by mouth nightlyHistorical Med      tranylcypromine (PARNATE) 10 MG tablet Take 20 mg by mouth 2 times dailyHistorical Med      apixaban (ELIQUIS) 2.5 MG TABS tablet Take 2.5 mg by mouth 2 times daily Historical Med      acetaminophen (TYLENOL) 500 MG tablet Take 500-1,000 mg by mouth every 6 hours as needed for Pain Historical Med      atorvastatin (LIPITOR) 10 MG tablet Take 10 mg by mouth nightly Historical Med             Time Spent on discharge is more than 35 minutes in the examination, evaluation, counseling and review of medications and discharge plan. Signed:    Justin Justice MD   11/15/2020      Thank you Josefina Lowry for the opportunity to be involved in this patient's care. If you have any questions or concerns please feel free to contact me at [phone].

## 2020-11-15 NOTE — PROGRESS NOTES
exercises L hand, importance of elevation for L hand, safety with transfers, home safety concerns, OT discharge recommendations, standing balance, WIS transfers  Barriers to Learning: pt demonstrates and verbalizes understanding. Pt forgetful and would benefit from repetition. REQUIRES OT FOLLOW UP: Yes    Activity Tolerance  Activity Tolerance: Patient Tolerated treatment well  Activity Tolerance: SpO2 remains >90% on RA  Safety Devices  Safety Devices in place: Yes  Type of devices: Call light within reach;Nurse notified; Chair alarm in place; Left in chair           Patient Diagnosis(es): The primary encounter diagnosis was Acute on chronic congestive heart failure, unspecified heart failure type ([address] A diagnosis of Altered mental status, unspecified altered mental status type was also pertinent to this visit. has a past medical history of Aortic dissection, thoracic ([address] Atrial fibrillation ([address] Atrial fibrillation with rapid ventricular response ([address] Chronic diastolic congestive heart failure ([address] Critical illness myopathy, Hx of repair of dissecting thoracic aortic aneurysm, [place] type A, Hypertension, Intermittent complete heart block ([address] Psychiatric problem, Renal artery stenosis ([address] Renal failure, S/P knee replacement, S/P thoracic aortic aneurysm repair, Secondary hyperparathyroidism, renal ([address] Thoracic aortic dissection ([address] and Urinary retention with incomplete bladder emptying. has a past surgical history that includes Hand surgery and Abdominal aortic aneurysm repair.            Restrictions  Restrictions/Precautions  Restrictions/Precautions: Up as Tolerated  Position Activity Restriction  Other position/activity restrictions: up with assistance, wrist brace as needed for comfort    Subjective   General  Chart Reviewed: Yes  Patient assessed for rehabilitation services?: Yes  Additional Pertinent Hx: aortic dissection, Afib, CHF, CKD  Family / Caregiver Present: No    Diagnosis: Pt presented after fall, hypoxia and AMS. Pt with acute CHF, MATHEW, Afib, L hand contusion. XR L hand: no acute fx, widening of scapholunate interval compatible with ligamentous injury. Subjective  Subjective: Pt supine in bed upon OT arrival. Pt states, \"I'm leaving today no matter what. \" Pt with some confusion and decreased insight into deficits during session  General Comment  Comments: RN Agreeable to OT session. Patient Currently in Pain: Yes  Pain Assessment  Pain Assessment: 0-10  Pain Level: 6  Patient's Stated Pain Goal: No pain  Pain Type: Acute pain  Pain Location: Hand  Pain Orientation: Left  Pain Descriptors: Aching  Pain Frequency: Continuous  Pain Onset: On-going  Clinical Progression: Not changed  Non-Pharmaceutical Pain Intervention(s): Distraction;Elevation;Repositioned  Response to Pain Intervention: Patient Satisfied  Multiple Pain Sites: No  Pre Treatment Pain Screening  Intervention List: Patient able to continue with treatment;Nurse/Physician notified  Vital Signs  Pulse: 85  BP: 110/68  Patient Currently in Pain: Yes     Social/Functional History  Social/Functional History  Lives With: Spouse  Type of Home: House  Home Layout: Work area in basement, Multi-level, Able to Live on Main level with bedroom/bathroom  Home Access: Stairs to enter with rails  Entrance Stairs - Number of Steps: 3 ROMEO from garage, 1 step from front  Bathroom Shower/Tub: Walk-in shower  Bathroom Toilet: Standard  Bathroom Equipment: Shower chair, Grab bars around toilet  Home Equipment: (none)  ADL Assistance: Independent  Homemaking Assistance: Needs assistance  Ambulation Assistance: Independent  Transfer Assistance: Independent  Active : Yes  Occupation: Part time employment  Additional Comments: patient is a questionable historian, reports going to work daily at [employer]. Wife available to provide 24/7 supervision/assist if needed.        Objective   Vision: Impaired  Vision Exceptions: Wears glasses for reading  Hearing: Within functional limits      Orientation  Overall Orientation Status: Within Functional Limits  Orientation Level: Oriented to person;Oriented to time;Oriented to place;Oriented to situation       Balance  Sitting Balance: Supervision  Standing Balance: Contact guard assistance(no AD)    Standing Balance  Time: 4-5 minutes  Activity: bathroom mobility, grooming at sink, ambulation 150 feet in hallway  Comment: CGA without AD. Pt unstedy. Functional Mobility  Functional - Mobility Device: No device  Activity: To/from bathroom; Other  Assist Level: Contact guard assistance  Functional Mobility Comments: 150 feet in hallway without AD. Cues to slow pace for safety. Toilet Transfers  Toilet - Technique: Ambulating  Equipment Used: Grab bars  Toilet Transfer: Contact guard assistance  Toilet Transfers Comments: CGA for approach, SBA to/from toilet using grab bar. Shower Transfers  Shower - Transfer Type: To and From  Shower - Transfer To: Standing  Shower - Technique: Ambulating  Shower Transfers: Contact Guard  Shower Transfers Comments: RUE support on grab bar    ADL  Grooming: Stand by assistance(standing at sink to wash R hand and face. Min cues for problem solving)  LE Dressing: Contact guard assistance; Increased time to complete(doff/don pants with socks.  Pt able to reach feet using figure 4 positioning)  Toileting: Contact guard assistance(intermittent use of R hand on grab bar, steadying assist,)     Tone RUE  RUE Tone: Normotonic  Tone LUE  LUE Tone: Normotonic  Coordination  Movements Are Fluid And Coordinated: Yes        Bed mobility  Supine to Sit: Stand by assistance(bedrail)     Transfers  Stand Step Transfers: Contact guard assistance  Sit to stand: Stand by assistance  Stand to sit: Stand by assistance  Transfer Comments: to/from EOB and chair    Vision - Basic Assessment  Prior Vision: Wears glasses only for reading  Patient Visual Report: No visual complaint reported. Visual Field Cut: No  Oculo Motor Control: WNL     Cognition  Overall Cognitive Status: Exceptions  Arousal/Alertness: Delayed responses to stimuli  Following Commands: Follows one step commands with increased time; Follows one step commands with repetition  Attention Span: Attends with cues to redirect  Memory: Decreased short term memory;Decreased recall of recent events  Safety Judgement: Decreased awareness of need for assistance;Decreased awareness of need for safety  Problem Solving: Decreased awareness of errors;Assistance required to identify errors made  Insights: Decreased awareness of deficits  Initiation: Requires cues for some  Sequencing: Does not require cues  Cognition Comment: Pt forgetful during session requiring frequent reminders of what he was asked to due. Pt requiring increased time for cognitive processing of complex commands.            Sensation  Overall Sensation Status: [facility]  Type of ROM/Therapeutic Exercise  Type of ROM/Therapeutic Exercise: AROM  Exercises  Grasp/Release: x10 L hand       LUE AROM (degrees)  LUE AROM : [facility]  LUE General AROM: shoulder flexion ~0-120 degress; limited by pain, pt reports chronic  Left Hand AROM (degrees)  Left Hand AROM: [facility]  Left Hand General AROM: pt with bracing to L hand/wrist. Pt able to wiggle/flex/extend all fingers with extra time  RUE AROM (degrees)  RUE AROM : [facility]  RUE General AROM: shoulder flexion ~0-140, limited by pain which pt reports is chronic    LUE Strength  Gross LUE Strength: Exceptions to [facility]  L Shoulder Flex: 4-/5  L Elbow Flex: 4/5  L Elbow Ext: 4/5  L Hand General: 3+/5  RUE Strength  Gross RUE Strength: Exceptions to [facility]  R Shoulder Flex: 4/5  R Elbow Flex: 4/5  R Elbow Ext: 4/5  R Hand General: 4+/5                   Plan   Plan  Times per week: 2-3x/week  Current Treatment Recommendations: Strengthening, Gait Training, Patient/Caregiver Education & Training, Home Management Training, ROM, Equipment Evaluation, Education, & procurement, Balance Training, Functional Mobility Training, Cognitive Reorientation, Positioning, Endurance Training, Pain Management, Cognitive/Perceptual Training, Self-Care / ADL, Safety Education & Training      AM-PAC Score        AM-PAC Inpatient Daily Activity Raw Score: 18 (11/15/20 1030)  AM-PAC Inpatient ADL T-Scale Score : 38.66 (11/15/20 1030)  ADL Inpatient CMS 0-100% Score: 46.65 (11/15/20 1030)  ADL Inpatient CMS G-Code Modifier : CK (11/15/20 1030)    Goals  Short term goals  Time Frame for Short term goals: upon discharge  Short term goal 1: Supervision functional transfers to/from EOB, chair, toilet  Short term goal 2: Supervision LB dressing  Short term goal 3: Supervision toileting  Short term goal 4: Set up assist UB dressing  Short term goal 5: Supervision grooming in stance at sink  Patient Goals   Patient goals : to return home       Therapy Time   Individual Concurrent Group Co-treatment   Time In 0946         Time Out 1014         Minutes 28         Timed Code Treatment Minutes: 13 Minutes(15 minute evaluation)     If pt is discharged prior to next OT session, this note will serve as the discharge summary.     Ishan Moy, OT

## 2020-11-15 NOTE — PROGRESS NOTES
Pt discharged to home via son. IV and telemetry removed. No complications noted upon IV removal. Discharge instructions and medications reviewed with patient and son. Both patient and son verbalized understanding of discharge instructions. Education provided on how to keep wound on left hand CDI. All of personal belongings sent home with patient.

## 2020-11-15 NOTE — CARE COORDINATION
Case Management Assessment            Discharge Note                    Date / Time of Note: 11/15/2020 3:09 PM                  Discharge Note Completed by: Torri Rudd    Patient Name: Ivon Bergeron   YOB: 1940  Diagnosis: CHF (congestive heart failure), NYHA class I, acute on chronic, combined ([facility] [I50.43]  CHF (congestive heart failure), NYHA class I, acute on chronic, combined ([facility] [I50.43]   Date / Time: 11/11/2020  1:44 AM    Current PCP: Jr Fabiana Ni patient: No    Hospitalization in the last 30 days: No    Advance Directives:  Code Status: Full Code  [place] DNR form completed and on chart: No    Financial:  Payor: Elin Lott / Plan: MEDICARE PART A AND B / Product Type: *No Product type* /      Pharmacy:    [facility], [address] [place] [phone]  [place] [facility]  [phone] [id number]  Phone: [phone] Fax: [phone]      Assistance purchasing medications?: Potential Assistance Purchasing Medications: No  Assistance provided by Case Management: None at this time    Does patient want to participate in local refill/ meds to beds program?: No    Meds To Beds General Rules:  1. Can ONLY be done Monday- Friday between 8:30am-5pm  2. Prescription(s) must be in pharmacy by 3pm to be filled same day  3. Copy of patient's insurance/ prescription drug card and patient face sheet must be sent along with the prescription(s)  4. Cost of Rx cannot be added to hospital bill. If financial assistance is needed, please contact unit  or ;  or  CANNOT provide pharmacy voucher for patients co-pays  5.  Patients can then  the prescription on their way out of the hospital at discharge, or pharmacy can deliver to the bedside if staff is available. (payment due at time of pick-up or delivery - cash, check, or card accepted)     Able to afford home medications/ co-pay costs: Yes    ADLS:  Current PT AM-PAC Score: 18 /24  Current OT AM-PAC Score: 18 /24      DISCHARGE Disposition: Home with Home Health Care: [facility]     LOC at discharge: Not Applicable  PRAVEEN Completed: Not Indicated    Notification completed in HENS/PAS?:  Not Applicable    IMM Completed:   Yes, Case management has presented and reviewed IMM letter #2 to the patient and/or family/ POA. Patient and/or family/POA verbalized understanding of their medicare rights and appeal process if needed. Patient and/or family/POA has signed, initialed and placed today's date (11/13/20) and time ( ) on IMM letter #2 on the the appropriate lines. Patient and/or family/POA, copy of letter offered and they are aware that this original copy of IMM letter #2 is available prior to discharge from the paper chart on the unit. Electronic documentation has been entered into epic for IMM letter #2 and original paper copy has been added to the paper chart at the nurses station. Transportation:  Transportation PLAN for discharge: family   [facility] ordered at discharge: Yes  [address]: [place]  Phone: [phone]  Fax: [phone]  [facility]  DME Provider: Silvina Ervin  Equipment obtained during hospitalization:     Home Oxygen and Respiratory Equipment:  Oxygen needed at discharge?: yes  [address]: [facility]  Portable tank available for discharge?: yes    Dialysis:  Dialysis patient: No    Dialysis Center:  Not Applicable      Additional CM Notes: Pt from home with wife, will return with [place] [address] and [facility] for [facility]. Rossi Kurtz called aware of DC today, wife to bring portable home O2.      The Plan for Transition of Care is related to the following treatment goals of CHF (congestive heart failure), NYHA class I, acute on chronic, combined (HCC) [I50.43]  CHF (congestive heart failure), NYHA class I, acute on chronic, combined [facility]) [I50.43]    The Patient and/or patient representative Tootie Tovar and his family were provided with a choice of provider and agrees with the discharge plan Yes    Freedom of choice list was provided with basic dialogue that supports the patient's individualized plan of care/goals and shares the quality data associated with the providers.  Yes    Care Transitions patient: No    Cristin Cardenas RN  [facility] [employer]  Case Management Department  Ph: [phone]  Fax: [phone]

## 2020-11-15 NOTE — PROGRESS NOTES
Hospitalist Progress Note      PCP: Justin Sullivan    Date of Admission: 11/11/2020    Chief Complaint: Fall and mental status change    Hospital Course: This is a 79year-old admitted after fall and mental status changes, left hand injury orthopedic consulted and viewed x-ray no fracture. Subjective: Patient denies any chest pain or shortness of breath alert oriented x1 name only place he stated he is at [facility], year 2020 he will like to go home      Medications:  Reviewed    Infusion Medications   Scheduled Medications    [START ON 11/16/2020] furosemide  40 mg Oral Daily    tranylcypromine  20 mg Oral BID    apixaban  2.5 mg Oral BID    atorvastatin  10 mg Oral Nightly    QUEtiapine  200 mg Oral Nightly    sodium chloride flush  10 mL Intravenous 2 times per day    metoprolol succinate  50 mg Oral BID     PRN Meds: OLANZapine, sodium chloride flush, acetaminophen      Intake/Output Summary (Last 24 hours) at 11/15/2020 1328  Last data filed at 11/15/2020 1200  Gross per 24 hour   Intake 240 ml   Output 300 ml   Net -60 ml       Physical Exam Performed:    /73   Pulse 69   Temp 98 °F (36.7 °C) (Oral)   Resp 18   Ht 6' 2\" (1.88 m)   Wt 165 lb 2 oz (74.9 kg)   SpO2 93%   BMI 21.20 kg/m²     General appearance: No apparent distress, appears stated age and cooperative. HEENT: Pupils equal, round, and reactive to light. Conjunctivae/corneas clear. Neck: Supple, with full range of motion. No jugular venous distention. Trachea midline. Respiratory:  Normal respiratory effort. Clear to auscultation, bilaterally without Rales/Wheezes/Rhonchi. Cardiovascular: Regular rate and rhythm with normal S1/S2 without murmurs, rubs or gallops. Abdomen: Soft, non-tender, non-distended with normal bowel sounds. Musculoskeletal: No clubbing, cyanosis or edema bilaterally. Full range of motion without deformity.   Left hand dressing in place slight edema  Skin: Skin color, texture, turgor normal.  No rashes or lesions. Multiple laceration in his left arm  Neurologic:  Neurovascularly intact without any focal sensory/motor deficits. Cranial nerves: II-XII intact, grossly non-focal.  Psychiatric: Alert and oriented, thought content appropriate, normal insight  Capillary Refill: Brisk,< 3 seconds   Peripheral Pulses: +2 palpable, equal bilaterally       Labs:   No results for input(s): WBC, HGB, HCT, PLT in the last 72 hours. Recent Labs     11/13/20  0929 11/14/20  0623 11/15/20  0712    138 134*   K 4.0 3.9 3.8   CL 93* 97* 96*   CO2 28 27 26   BUN 40* 41* 48*   CREATININE 3.2* 3.3* 3.5*   CALCIUM 8.7 9.1 9.2   PHOS 3.4 3.5 3.9     No results for input(s): AST, ALT, BILIDIR, BILITOT, ALKPHOS in the last 72 hours. No results for input(s): INR in the last 72 hours. No results for input(s): Felipa Basques in the last 72 hours. Urinalysis:      Lab Results   Component Value Date    NITRU Negative 11/11/2020    WBCUA 0-2 11/11/2020    RBCUA None seen 11/11/2020    BLOODU TRACE-INTACT 11/11/2020    SPECGRAV 1.020 11/11/2020    GLUCOSEU Negative 11/11/2020       Radiology:  XR HAND LEFT (MIN 3 VIEWS)   Final Result   1. Widening of the scapholunate interval compatible with ligamentous injury, age-indeterminate. 2. Severe osteoarthritis involving the trapeziometacarpal joint. 3. No acute fracture. US RENAL COMPLETE   Final Result      1. Increased echogenicity throughout the renal cortex and some renal atrophy without stone disease or hydronephrosis. 2. Simple cysts identified in left kidney measuring up to 1.9 x 1.7 cm   3. Urinary bladder not well distended but unremarkable with no gross stone disease               CT CERVICAL SPINE WO CONTRAST   Final Result     No acute findings in the cervical spine. CT HEAD WO CONTRAST   Final Result     No acute infarct, hemorrhage, mass or edema.           XR CHEST PORTABLE   Final Result     No acute findings in the chest. Assessment/Plan:    Active Hospital Problems    Diagnosis    CHF (congestive heart failure), NYHA class I, acute on chronic, [facility] [I50.43]     1. Admitted with acute congestive heart failure with preserved EF continue with the Lasix, Lasix dose was reduced per nephrology due to worsening creatinine today. At home patient to continue with the 20 mg p.o. twice daily Lasix dose. 2.  Acute kidney injury on chronic kidney disease nephrology consulted and following. 3.  Atrial fibrillation rate is controlled on chronic anticoagulation with Eliquis. Toprol XL dose was increased during hospitalization from 25 daily to 50 mg twice daily, patient to follow-up with PCP within a week for further adjustment of medication. 4.  Thrombocytopenia chronic stable. 5.  Depression continue with home medication. 6.  Left hand injury patient was seen evaluated by orthopedic no fracture no surgery needed, recommended conservative management with a local daily wound care to the thumb workplace and area for skin tear with adaptic and 4 x 4 dressing and soft dressing, also provided a thumb spica Ace that he can wear for comfort. 7.  Today patient requesting to go home as well as family wants him to be discharged if okay with the nephrology will discharge patient home today to follow-up with the nephrology with renal function.     DVT Prophylaxis: Eliquis  Diet: DIET LOW SODIUM 2 GM;  Dietary Nutrition Supplements: Standard High Calorie Oral Supplement  Code Status: Full Code    PT/OT Eval Status:     Reyes Banana, MD

## 2021-04-26 ENCOUNTER — HOSPITAL ENCOUNTER (EMERGENCY)
Age: 81
Discharge: ANOTHER HOSPITAL | End: 2021-04-26
Attending: EMERGENCY MEDICINE
Payer: MEDICARE

## 2021-04-26 ENCOUNTER — APPOINTMENT (OUTPATIENT)
Dept: GENERAL RADIOLOGY | Age: 81
End: 2021-04-26
Payer: MEDICARE

## 2021-04-26 ENCOUNTER — APPOINTMENT (OUTPATIENT)
Dept: CT IMAGING | Age: 81
End: 2021-04-26
Payer: MEDICARE

## 2021-04-26 VITALS
RESPIRATION RATE: 12 BRPM | TEMPERATURE: 98.2 F | SYSTOLIC BLOOD PRESSURE: 113 MMHG | HEART RATE: 82 BPM | DIASTOLIC BLOOD PRESSURE: 78 MMHG | OXYGEN SATURATION: 97 %

## 2021-04-26 DIAGNOSIS — I50.9 CONGESTIVE HEART FAILURE, UNSPECIFIED HF CHRONICITY, UNSPECIFIED HEART FAILURE TYPE (HCC): Primary | ICD-10-CM

## 2021-04-26 DIAGNOSIS — R53.1 GENERAL WEAKNESS: ICD-10-CM

## 2021-04-26 LAB
ANION GAP SERPL CALCULATED.3IONS-SCNC: 12 MMOL/L (ref 3–16)
BACTERIA: ABNORMAL /HPF
BASOPHILS ABSOLUTE: 0.1 K/UL (ref 0–0.2)
BASOPHILS RELATIVE PERCENT: 0.9 %
BILIRUBIN URINE: NEGATIVE
BLOOD, URINE: NEGATIVE
BUN BLDV-MCNC: 50 MG/DL (ref 7–20)
CALCIUM SERPL-MCNC: 7.3 MG/DL (ref 8.3–10.6)
CHLORIDE BLD-SCNC: 94 MMOL/L (ref 99–110)
CLARITY: CLEAR
CO2: 25 MMOL/L (ref 21–32)
COLOR: YELLOW
CREAT SERPL-MCNC: 3.2 MG/DL (ref 0.8–1.3)
EKG DIAGNOSIS: NORMAL
EKG Q-T INTERVAL: 376 MS
EKG QRS DURATION: 88 MS
EKG QTC CALCULATION (BAZETT): 462 MS
EKG R AXIS: 85 DEGREES
EKG T AXIS: 48 DEGREES
EKG VENTRICULAR RATE: 91 BPM
EOSINOPHILS ABSOLUTE: 0.4 K/UL (ref 0–0.6)
EOSINOPHILS RELATIVE PERCENT: 4.7 %
GFR AFRICAN AMERICAN: 23
GFR NON-AFRICAN AMERICAN: 19
GLUCOSE BLD-MCNC: 123 MG/DL (ref 70–99)
GLUCOSE BLD-MCNC: 97 MG/DL (ref 70–99)
GLUCOSE URINE: NEGATIVE MG/DL
HCT VFR BLD CALC: 29.4 % (ref 40.5–52.5)
HEMOGLOBIN: 9.4 G/DL (ref 13.5–17.5)
KETONES, URINE: NEGATIVE MG/DL
LEUKOCYTE ESTERASE, URINE: NEGATIVE
LYMPHOCYTES ABSOLUTE: 0.3 K/UL (ref 1–5.1)
LYMPHOCYTES RELATIVE PERCENT: 4.3 %
MAGNESIUM: 1.8 MG/DL (ref 1.8–2.4)
MCH RBC QN AUTO: 28.3 PG (ref 26–34)
MCHC RBC AUTO-ENTMCNC: 31.9 G/DL (ref 31–36)
MCV RBC AUTO: 88.6 FL (ref 80–100)
MICROSCOPIC EXAMINATION: YES
MONOCYTES ABSOLUTE: 0.7 K/UL (ref 0–1.3)
MONOCYTES RELATIVE PERCENT: 9.1 %
NEUTROPHILS ABSOLUTE: 6.2 K/UL (ref 1.7–7.7)
NEUTROPHILS RELATIVE PERCENT: 81 %
NITRITE, URINE: NEGATIVE
PDW BLD-RTO: 19 % (ref 12.4–15.4)
PERFORMED ON: ABNORMAL
PH UA: 5.5 (ref 5–8)
PLATELET # BLD: 96 K/UL (ref 135–450)
PMV BLD AUTO: 11.8 FL (ref 5–10.5)
POTASSIUM REFLEX MAGNESIUM: 3.5 MMOL/L (ref 3.5–5.1)
PRO-BNP: ABNORMAL PG/ML (ref 0–449)
PROTEIN UA: ABNORMAL MG/DL
RBC # BLD: 3.32 M/UL (ref 4.2–5.9)
RBC UA: ABNORMAL /HPF (ref 0–4)
SARS-COV-2, NAAT: NOT DETECTED
SODIUM BLD-SCNC: 131 MMOL/L (ref 136–145)
SPECIFIC GRAVITY UA: 1.02 (ref 1–1.03)
URINE REFLEX TO CULTURE: ABNORMAL
URINE TYPE: ABNORMAL
UROBILINOGEN, URINE: 0.2 E.U./DL
WBC # BLD: 7.6 K/UL (ref 4–11)
WBC UA: ABNORMAL /HPF (ref 0–5)

## 2021-04-26 PROCEDURE — 87635 SARS-COV-2 COVID-19 AMP PRB: CPT

## 2021-04-26 PROCEDURE — 36415 COLL VENOUS BLD VENIPUNCTURE: CPT

## 2021-04-26 PROCEDURE — 93005 ELECTROCARDIOGRAM TRACING: CPT | Performed by: EMERGENCY MEDICINE

## 2021-04-26 PROCEDURE — 85025 COMPLETE CBC W/AUTO DIFF WBC: CPT

## 2021-04-26 PROCEDURE — 99284 EMERGENCY DEPT VISIT MOD MDM: CPT

## 2021-04-26 PROCEDURE — 6370000000 HC RX 637 (ALT 250 FOR IP): Performed by: EMERGENCY MEDICINE

## 2021-04-26 PROCEDURE — 96374 THER/PROPH/DIAG INJ IV PUSH: CPT

## 2021-04-26 PROCEDURE — 71046 X-RAY EXAM CHEST 2 VIEWS: CPT

## 2021-04-26 PROCEDURE — 83735 ASSAY OF MAGNESIUM: CPT

## 2021-04-26 PROCEDURE — 81001 URINALYSIS AUTO W/SCOPE: CPT

## 2021-04-26 PROCEDURE — 83880 ASSAY OF NATRIURETIC PEPTIDE: CPT

## 2021-04-26 PROCEDURE — 6360000002 HC RX W HCPCS: Performed by: EMERGENCY MEDICINE

## 2021-04-26 PROCEDURE — 70450 CT HEAD/BRAIN W/O DYE: CPT

## 2021-04-26 PROCEDURE — 80048 BASIC METABOLIC PNL TOTAL CA: CPT

## 2021-04-26 RX ORDER — QUETIAPINE FUMARATE 25 MG/1
25 TABLET, FILM COATED ORAL ONCE
Status: COMPLETED | OUTPATIENT
Start: 2021-04-26 | End: 2021-04-26

## 2021-04-26 RX ORDER — FERROUS SULFATE 325(65) MG
325 TABLET ORAL
COMMUNITY

## 2021-04-26 RX ORDER — FUROSEMIDE 10 MG/ML
40 INJECTION INTRAMUSCULAR; INTRAVENOUS ONCE
Status: COMPLETED | OUTPATIENT
Start: 2021-04-26 | End: 2021-04-26

## 2021-04-26 RX ORDER — CHOLECALCIFEROL (VITAMIN D3) 125 MCG
5 CAPSULE ORAL NIGHTLY
COMMUNITY

## 2021-04-26 RX ORDER — ESOMEPRAZOLE MAGNESIUM 20 MG/1
20 FOR SUSPENSION ORAL DAILY
COMMUNITY

## 2021-04-26 RX ADMIN — QUETIAPINE FUMARATE 25 MG: 25 TABLET ORAL at 21:49

## 2021-04-26 RX ADMIN — FUROSEMIDE 40 MG: 10 INJECTION, SOLUTION INTRAMUSCULAR; INTRAVENOUS at 20:30

## 2021-04-26 NOTE — ED TRIAGE NOTES
Pt sent in for altered mental status. Per squad pt was with family at the dinner table and was unable to get up.

## 2021-04-26 NOTE — PROGRESS NOTES
List of Home Medications the patient is currently taking is complete. Home Medication list in EPIC updated to reflect changes noted below. Source of medications in list is conversation with patient's wife, patient's pharmacy. Please note:  · Patient had morning medications, did not have any evening medications before coming to ED. The following medications were added to admission medication list:  Esomeprazole 20 mg  Vit D 1000 units QD  Ferrous sulfate 325 mg QD  Melatonin 5 mg QHS    The following medications were removed from admission medication list:  None    The following medication instructions/doses were adjusted to reflect how patient is taking:  Metoprolol succinate 25 mg BID - changed from 50 mg BID      Please call with questions.   Giovani Zamarripa - PharmD Candidate [phone] [phone]  4/26/2021 7:46 PM      Updated medication list 4/26/2021  Medication Sig    esomeprazole Magnesium (NEXIUM) 20 MG PACK Take 20 mg by mouth daily    melatonin 5 MG TABS tablet Take 5 mg by mouth nightly    ferrous sulfate (IRON 325) 325 (65 Fe) MG tablet Take 325 mg by mouth daily (with breakfast)    vitamin D 25 MCG (1000 UT) CAPS Take 1,000 Units by mouth daily    metoprolol succinate (TOPROL XL) 50 MG extended release tablet Take 1 tablet by mouth 2 times daily (Patient taking differently: Take 25 mg by mouth 2 times daily )    furosemide (LASIX) 20 MG tablet Take 20 mg by mouth 2 times daily    QUEtiapine (SEROQUEL) 25 MG tablet Take 25 mg by mouth 2 times daily     tranylcypromine (PARNATE) 10 MG tablet Take 20 mg by mouth 2 times daily    apixaban (ELIQUIS) 2.5 MG TABS tablet Take 2.5 mg by mouth 2 times daily     acetaminophen (TYLENOL) 500 MG tablet Take 500-1,000 mg by mouth every 6 hours as needed for Pain     atorvastatin (LIPITOR) 10 MG tablet Take 10 mg by mouth nightly

## 2021-04-26 NOTE — ED NOTES
Bed: 1-  Expected date:   Expected time:   Means of arrival:   Comments:  Emiliana Treadwell RN  04/26/21 8167

## 2021-04-27 NOTE — ED NOTES
Report given to [facility] RN at [place].      December LAITH Hernández  04/26/21 0198

## 2021-04-27 NOTE — ED NOTES
Attempted to call report to [facility], but RN unavailable. Will call back.      Mónica Rosales RN  04/26/21 5918

## 2021-04-27 NOTE — ED PROVIDER NOTES
[address] [place]          ATTENDING PHYSICIAN NOTE       Date of evaluation: 4/26/2021    Chief Complaint     Altered Mental Status and Fatigue      History of Present Illness     Joni Vega is a 79 y.o. male with a history of atrial fibrillation and congestive heart failure who was just hospitalized at the [facility] from April 5 through April 16 for acute on chronic diastolic congestive heart failure with hypoxia and then transferred to a nursing home afterwards who is presenting today with generalized weakness and inability to stand up and increase in confusion. The patient was at home with a neighbor on the back patio and apparently he attempted to get up and could not do so and became somewhat more confused. He is wearing 2 L of oxygen at home since his last hospitalization. The patient did not fall but he has been experiencing more frequent falls at home. He is overall a very poor historian and is not able to give a very clear history as to what happened today at home because he also has some baseline dementia as well. His wife had wanted him to go to [facility] but EMS did not want to take him there.     Review of Systems     Review of Systems   Unable to perform ROS: Mental status change       Past Medical, Surgical, Family, and Social History     He has a past medical history of Aortic dissection, thoracic ([address] Atrial fibrillation ([address] Atrial fibrillation with rapid ventricular response ([address] Chronic diastolic congestive heart failure ([address] Chronic kidney disease, Critical illness myopathy, Depression, Hx of repair of dissecting thoracic aortic aneurysm, Mehdi type A, Hypertension, Intermittent complete heart block ([address] Psychiatric problem, Renal artery stenosis ([address] Renal failure, S/P knee replacement, S/P thoracic aortic aneurysm repair, Secondary hyperparathyroidism, renal ([address] Thoracic aortic dissection ([address] and Urinary retention with incomplete bladder emptying. He has a past surgical history that includes Hand surgery and Abdominal aortic aneurysm repair. His family history includes Alcohol Abuse in his son. He reports that he has never smoked. He has never used smokeless tobacco. He reports current alcohol use of about 6.0 standard drinks of alcohol per week. He reports that he does not use drugs. Medications     Previous Medications    ACETAMINOPHEN (TYLENOL) 500 MG TABLET    Take 500-1,000 mg by mouth every 6 hours as needed for Pain     APIXABAN (ELIQUIS) 2.5 MG TABS TABLET    Take 2.5 mg by mouth 2 times daily     ATORVASTATIN (LIPITOR) 10 MG TABLET    Take 10 mg by mouth nightly     ESOMEPRAZOLE MAGNESIUM (NEXIUM) 20 MG PACK    Take 20 mg by mouth daily    FERROUS SULFATE (IRON 325) 325 (65 FE) MG TABLET    Take 325 mg by mouth daily (with breakfast)    FUROSEMIDE (LASIX) 20 MG TABLET    Take 20 mg by mouth 2 times daily    MELATONIN 5 MG TABS TABLET    Take 5 mg by mouth nightly    METOPROLOL SUCCINATE (TOPROL XL) 50 MG EXTENDED RELEASE TABLET    Take 1 tablet by mouth 2 times daily    QUETIAPINE (SEROQUEL) 25 MG TABLET    Take 25 mg by mouth 2 times daily     TRANYLCYPROMINE (PARNATE) 10 MG TABLET    Take 20 mg by mouth 2 times daily    VITAMIN D 25 MCG (1000 UT) CAPS    Take 1,000 Units by mouth daily       Allergies     He is allergic to dabigatran etexilate mesylate; demerol hcl [meperidine]; lithium; pcn [penicillins]; and dabigatran. Physical Exam     INITIAL VITALS: BP: 95/64, Temp: 98.2 °F (36.8 °C), Pulse: 86, Resp: 18, SpO2: 100 %   Physical Exam  Vitals signs and nursing note reviewed. Constitutional:       General: He is not in acute distress. Appearance: He is well-developed. He is not diaphoretic. Eyes:      Extraocular Movements: Extraocular movements intact. Pupils: Pupils are equal, round, and reactive to light. Cardiovascular:      Rate and Rhythm: Normal rate and regular rhythm. Pulmonary:      Effort: Pulmonary effort is normal.      Breath sounds: Rales present. Abdominal:      General: Bowel sounds are normal. There is no distension. Palpations: Abdomen is soft. Skin:     General: Skin is warm and dry. Neurological:      Mental Status: He is alert. He is disoriented. Diagnostic Results     EKG   EKG Interpretation    Interpreted by me    Rhythm: atrial fibrillation -normal 8  Rate: 91  Axis: normal  Ectopy: none  Conduction: irregularly irregular  ST Segments: No acute change  T Waves: no acute change  Q Waves: No acute change    Clinical Impression: atrial fibrillation with controlled rate    RADIOLOGY:  XR CHEST (2 VW)   Final Result   1. Multifocal bilateral airspace opacities favored to represent bilateral pneumonia versus pulmonary edema. CT Head WO Contrast   Final Result      1. NO ACUTE INTRACRANIAL ABNORMALITY.              LABS:   Results for orders placed or performed during the hospital encounter of 04/26/21   CBC auto differential   Result Value Ref Range    WBC 7.6 4.0 - 11.0 K/uL    RBC 3.32 (L) 4.20 - 5.90 M/uL    Hemoglobin 9.4 (L) 13.5 - 17.5 g/dL    Hematocrit 29.4 (L) 40.5 - 52.5 %    MCV 88.6 80.0 - 100.0 fL    MCH 28.3 26.0 - 34.0 pg    MCHC 31.9 31.0 - 36.0 g/dL    RDW 19.0 (H) 12.4 - 15.4 %    Platelets 96 (L) 758 - 450 K/uL    MPV 11.8 (H) 5.0 - 10.5 fL    Neutrophils % 81.0 %    Lymphocytes % 4.3 %    Monocytes % 9.1 %    Eosinophils % 4.7 %    Basophils % 0.9 %    Neutrophils Absolute 6.2 1.7 - 7.7 K/uL    Lymphocytes Absolute 0.3 (L) 1.0 - 5.1 K/uL    Monocytes Absolute 0.7 0.0 - 1.3 K/uL    Eosinophils Absolute 0.4 0.0 - 0.6 K/uL    Basophils Absolute 0.1 0.0 - 0.2 K/uL   Basic Metabolic Panel w/ Reflex to MG   Result Value Ref Range    Sodium 131 (L) 136 - 145 mmol/L    Potassium reflex Magnesium 3.5 3.5 - 5.1 mmol/L    Chloride 94 (L) 99 - 110 mmol/L    CO2 25 21 - 32 mmol/L    Anion Gap 12 3 - 16    Glucose 97 70 - 99 mg/dL DECISIONMAKING / ASSESSMENT / Joseph Chavarria is a 79 y.o. male presenting from home with generalized weakness and increasing oxygen requirement with a history of congestive heart failure. Our evaluation here reveals bilateral airspace opacities likely related to pulmonary edema from congestive heart failure. BNP is also elevated. He was treated with Lasix in the emergency department. Pneumonia was felt to be less likely given the normal white blood cell count. The patient is on a baseline of 2 L and is now requiring 3 to 4 L to maintain an oxygen saturation above 90%. It was felt that the patient would need to be admitted for further management given the degree of confusion which is likely an exacerbation of his dementia and the new oxygen requirement and pulmonary edema on his chest x-ray. The wife wished for the patient to be admitted to [facility] because that is where he recently was hospitalized and that is where all of his doctors are and she prefers him to be with the doctors that are familiar with him, including his cardiologist.    Clinical Impression     1. Congestive heart failure, unspecified HF chronicity, unspecified heart failure type ([address]    2.  General weakness        Disposition     DISPOSITION Decision To Transfer 04/26/2021 07:49:43 PM       Corbett Barthel, MD  04/26/21 [id number]